# Patient Record
Sex: FEMALE | Race: WHITE | Employment: FULL TIME | URBAN - METROPOLITAN AREA
[De-identification: names, ages, dates, MRNs, and addresses within clinical notes are randomized per-mention and may not be internally consistent; named-entity substitution may affect disease eponyms.]

---

## 2019-04-18 ENCOUNTER — OFFICE VISIT (OUTPATIENT)
Dept: FAMILY MEDICINE CLINIC | Facility: CLINIC | Age: 29
End: 2019-04-18
Payer: COMMERCIAL

## 2019-04-18 VITALS
DIASTOLIC BLOOD PRESSURE: 80 MMHG | OXYGEN SATURATION: 98 % | HEIGHT: 67 IN | TEMPERATURE: 98 F | SYSTOLIC BLOOD PRESSURE: 120 MMHG | HEART RATE: 80 BPM | RESPIRATION RATE: 18 BRPM | WEIGHT: 235 LBS | BODY MASS INDEX: 36.88 KG/M2

## 2019-04-18 DIAGNOSIS — H65.02 NON-RECURRENT ACUTE SEROUS OTITIS MEDIA OF LEFT EAR: ICD-10-CM

## 2019-04-18 DIAGNOSIS — J03.90 EXUDATIVE TONSILLITIS: ICD-10-CM

## 2019-04-18 DIAGNOSIS — R50.9 FEVER, UNSPECIFIED FEVER CAUSE: Primary | ICD-10-CM

## 2019-04-18 PROCEDURE — 99202 OFFICE O/P NEW SF 15 MIN: CPT | Performed by: PHYSICIAN ASSISTANT

## 2019-04-18 PROCEDURE — 87880 STREP A ASSAY W/OPTIC: CPT | Performed by: PHYSICIAN ASSISTANT

## 2019-04-18 PROCEDURE — 87502 INFLUENZA DNA AMP PROBE: CPT | Performed by: PHYSICIAN ASSISTANT

## 2019-04-18 RX ORDER — AMOXICILLIN 875 MG/1
875 TABLET, COATED ORAL 2 TIMES DAILY
Qty: 20 TABLET | Refills: 0 | Status: SHIPPED | OUTPATIENT
Start: 2019-04-18 | End: 2019-04-28

## 2019-04-18 NOTE — PROGRESS NOTES
CHIEF COMPLAINT:   Patient presents with:  Cough: fever x 4 days       HPI:   Braxton Perales is a 34year old female who presents for uri symptoms for  4 days.   Patient reports temps up to 101.3, body aches the first 2days- now resolved, nasal congestion, WNL.      NOSE: Nostrils patent, + minimal nasal discharge, nasal mucosa inflamed   THROAT: oral mucosa pink, moist. Posterior pharynx  erythematous and injected. + exudates to left. Tonsils 2+/4.   No uvular deviation, drooling, muffled voice, hot potato reduce your discomfort while giving your throat a chance to heal.  Moisten and soothe your throat  Tips include the following:  · Try a sip of water first thing after waking up.   · Keep your throat moist by drinking 6 or more glasses of clear liquids every and swollen glands in the neck or jaw   Date Last Reviewed: 8/1/2016  © 9936-6313 [employer]. [address] [facility] [place], [facility] [place]. All rights reserved. This information is not intended as a substitute for professional medical care.  Alw

## 2019-04-22 ENCOUNTER — HOSPITAL ENCOUNTER (OUTPATIENT)
Age: 29
Discharge: HOME | End: 2019-04-22
Attending: FAMILY MEDICINE
Payer: COMMERCIAL

## 2019-04-22 VITALS
SYSTOLIC BLOOD PRESSURE: 117 MMHG | HEART RATE: 78 BPM | WEIGHT: 230 LBS | OXYGEN SATURATION: 98 % | TEMPERATURE: 98 F | HEIGHT: 67 IN | BODY MASS INDEX: 36.1 KG/M2 | DIASTOLIC BLOOD PRESSURE: 85 MMHG | RESPIRATION RATE: 16 BRPM

## 2019-04-22 DIAGNOSIS — J33.9 NASAL POLYP: ICD-10-CM

## 2019-04-22 DIAGNOSIS — R09.81 NASAL CONGESTION: ICD-10-CM

## 2019-04-22 DIAGNOSIS — R04.0 EPISTAXIS: Primary | ICD-10-CM

## 2019-04-22 PROCEDURE — 99203 OFFICE O/P NEW LOW 30 MIN: CPT

## 2019-04-22 PROCEDURE — 99204 OFFICE O/P NEW MOD 45 MIN: CPT

## 2019-04-22 RX ORDER — METHYLPREDNISOLONE 4 MG/1
TABLET ORAL
Qty: 21 TABLET | Refills: 0 | Status: SHIPPED | OUTPATIENT
Start: 2019-04-22 | End: 2019-07-02

## 2019-04-22 NOTE — ED INITIAL ASSESSMENT (HPI)
Frequent nose bleeds from the right side x few days. States this am after a coughing fit took 40minutes to stop the bleeding. Finally blew out a clot .  Using a saline nose spray with gel

## 2019-04-22 NOTE — ED PROVIDER NOTES
Patient Seen in: [address] [facility]    History   Patient presents with:  Nose Bleed (nasopharyngeal)    Stated Complaint: frequent nosebleeds/coughing    HPI    **46-year-old female presents to the immediate care today with chief complaints There are no obvious bleeders noted in the nasal mucosa. There is a small ulcer noted on the right nasal septum. The left nasal cavity has a very large nasal polyp. No active bleeding noted from it. No acute epistaxis noted.   Throat: lips, mucosa, and

## 2019-07-02 ENCOUNTER — APPOINTMENT (OUTPATIENT)
Dept: GENERAL RADIOLOGY | Age: 29
End: 2019-07-02
Attending: FAMILY MEDICINE
Payer: COMMERCIAL

## 2019-07-02 ENCOUNTER — HOSPITAL ENCOUNTER (OUTPATIENT)
Age: 29
Discharge: HOME | End: 2019-07-02
Attending: FAMILY MEDICINE
Payer: COMMERCIAL

## 2019-07-02 VITALS
TEMPERATURE: 98 F | SYSTOLIC BLOOD PRESSURE: 110 MMHG | WEIGHT: 225 LBS | OXYGEN SATURATION: 100 % | BODY MASS INDEX: 35 KG/M2 | HEART RATE: 76 BPM | DIASTOLIC BLOOD PRESSURE: 62 MMHG | RESPIRATION RATE: 16 BRPM

## 2019-07-02 DIAGNOSIS — M77.50 FOOT TENDINITIS: Primary | ICD-10-CM

## 2019-07-02 PROCEDURE — 99213 OFFICE O/P EST LOW 20 MIN: CPT

## 2019-07-02 PROCEDURE — 73630 X-RAY EXAM OF FOOT: CPT | Performed by: FAMILY MEDICINE

## 2019-07-02 RX ORDER — NAPROXEN SODIUM 220 MG
1-2 TABLET ORAL
COMMUNITY

## 2019-07-02 RX ORDER — NAPROXEN 500 MG/1
500 TABLET ORAL 2 TIMES DAILY PRN
Qty: 20 TABLET | Refills: 0 | Status: SHIPPED | OUTPATIENT
Start: 2019-07-02 | End: 2019-07-09

## 2019-07-02 NOTE — ED PROVIDER NOTES
Patient Seen in: [address] [facility]    History   Patient presents with: Foot Pain    Stated Complaint: left foot pain    HPI    53-year-old female presents with complaints of left foot pain for the past 1 week.   She recently started running swelling.     ED Course   Labs Reviewed - No data to display       Xr Foot, Complete (min 3 Views), Left (cpt=73630)    Result Date: 7/2/2019  PROCEDURE:  XR FOOT, COMPLETE (MIN 3 VIEWS), LEFT (CPT=73630)  TECHNIQUE:  AP, oblique, and lateral views were obt

## 2019-07-02 NOTE — ED INITIAL ASSESSMENT (HPI)
Wed sathish Pt states she went jogging, Pt woke with left lateral foot pain.  +constant ache with intermittent sharpness when pressure applied/ambulation. Denies previous injury.

## 2019-07-23 LAB
CONTROL LINE PRESENT WITH A CLEAR BACKGROUND (YES/NO): YES YES/NO
POCT INFLUENZA A: NEGATIVE
POCT INFLUENZA B: NEGATIVE

## 2020-02-03 ENCOUNTER — OFFICE VISIT (OUTPATIENT)
Dept: FAMILY MEDICINE CLINIC | Facility: CLINIC | Age: 30
End: 2020-02-03
Payer: COMMERCIAL

## 2020-02-03 VITALS
OXYGEN SATURATION: 98 % | HEART RATE: 77 BPM | SYSTOLIC BLOOD PRESSURE: 122 MMHG | BODY MASS INDEX: 34.53 KG/M2 | TEMPERATURE: 99 F | DIASTOLIC BLOOD PRESSURE: 66 MMHG | HEIGHT: 67 IN | WEIGHT: 220 LBS

## 2020-02-03 DIAGNOSIS — R68.89 FLU-LIKE SYMPTOMS: Primary | ICD-10-CM

## 2020-02-03 DIAGNOSIS — H66.002 NON-RECURRENT ACUTE SUPPURATIVE OTITIS MEDIA OF LEFT EAR WITHOUT SPONTANEOUS RUPTURE OF TYMPANIC MEMBRANE: ICD-10-CM

## 2020-02-03 LAB
OPERATOR ID: NORMAL
POCT INFLUENZA A: NEGATIVE
POCT INFLUENZA B: NEGATIVE

## 2020-02-03 PROCEDURE — 87502 INFLUENZA DNA AMP PROBE: CPT | Performed by: PHYSICIAN ASSISTANT

## 2020-02-03 PROCEDURE — 99213 OFFICE O/P EST LOW 20 MIN: CPT | Performed by: PHYSICIAN ASSISTANT

## 2020-02-03 RX ORDER — AMOXICILLIN 875 MG/1
875 TABLET, COATED ORAL 2 TIMES DAILY
Qty: 20 TABLET | Refills: 0 | Status: SHIPPED | OUTPATIENT
Start: 2020-02-03 | End: 2020-02-13

## 2020-02-03 NOTE — PATIENT INSTRUCTIONS
-Sudafed and flonase  -Push fluids  -Go to the ER with any worsening symptoms. Middle Ear Infection (Adult)  You have an infection of the middle ear, the space behind the eardrum. This is also called acute otitis media (AOM).  Sometimes it is caused by professional's instructions.

## 2020-02-03 NOTE — PROGRESS NOTES
CHIEF COMPLAINT:   Patient presents with:  URI: cough, congestion, flem, left ear pain x 2 days. HPI:   Caroline Davies is a 34year old female who presents for URI symtoms since yesterday.   Patient reports fevers up to 100, body aches, nasal congest retraction, + fluid, bony landmarks obscured.  Left EAC WNL.  Right TM not erythematous, no bulging, no retraction, + fluid, bony landmarks intact.  Right EAC WNL.       NOSE: Nostrils patent, + nasal discharge, nasal mucosa inflamed   THROAT: oral mucosa p Ear Infection (Adult)  You have an infection of the middle ear, the space behind the eardrum. This is also called acute otitis media (AOM). Sometimes it is caused by the common cold.  This is because congestion can block the internal passage (eustachian tub issues and agrees to the plan. The patient is asked to return if sx's persist or worsen.

## 2021-01-07 ENCOUNTER — APPOINTMENT (OUTPATIENT)
Dept: GENERAL RADIOLOGY | Age: 31
End: 2021-01-07
Attending: NURSE PRACTITIONER
Payer: COMMERCIAL

## 2021-01-07 ENCOUNTER — HOSPITAL ENCOUNTER (OUTPATIENT)
Age: 31
Discharge: HOME | End: 2021-01-07
Payer: COMMERCIAL

## 2021-01-07 VITALS
DIASTOLIC BLOOD PRESSURE: 70 MMHG | HEART RATE: 79 BPM | RESPIRATION RATE: 16 BRPM | TEMPERATURE: 98 F | SYSTOLIC BLOOD PRESSURE: 126 MMHG

## 2021-01-07 DIAGNOSIS — S69.92XA INJURY OF LEFT HAND, INITIAL ENCOUNTER: Primary | ICD-10-CM

## 2021-01-07 DIAGNOSIS — S60.222A CONTUSION OF LEFT HAND, INITIAL ENCOUNTER: ICD-10-CM

## 2021-01-07 PROCEDURE — L3809 WHFO W/O JOINTS PRE OTS: HCPCS | Performed by: NURSE PRACTITIONER

## 2021-01-07 PROCEDURE — 73130 X-RAY EXAM OF HAND: CPT | Performed by: NURSE PRACTITIONER

## 2021-01-07 PROCEDURE — 99203 OFFICE O/P NEW LOW 30 MIN: CPT | Performed by: NURSE PRACTITIONER

## 2021-01-07 NOTE — ED INITIAL ASSESSMENT (HPI)
Pt sts hit a punching bag very hard with left hand during a work out on Tuesday. Pain/popping to 5th MP joint. Pt is right handed.

## 2021-01-07 NOTE — ED PROVIDER NOTES
[facility]      History   Patient presents with:  Arm or Hand Injury    Stated Complaint: hand pain/injury    HPI/Subjective:   21-year-old female presents to the IC with complaints of left hand pain over the fifth knuckle reg developed, well nourished,in no apparent distress  SKIN: no rashes,no suspicious lesions  NECK: supple,no adenopathy,no bruits  LUNGS: clear to auscultation  CARDIO: RRR     EXTREMITIES: no cyanosis, clubbing or edema  Exam of L wrist -->   - swelling: yes return and appropriate follow-up was given in writing. Patient and/or family agrees to return for any concerns, problems or complications as discussed and voiced understanding and all questions were answered at this time.                          Dispositi

## 2021-01-15 ENCOUNTER — HOSPITAL ENCOUNTER (OUTPATIENT)
Age: 31
Discharge: HOME | End: 2021-01-15
Payer: COMMERCIAL

## 2021-01-15 VITALS
SYSTOLIC BLOOD PRESSURE: 125 MMHG | OXYGEN SATURATION: 100 % | TEMPERATURE: 98 F | DIASTOLIC BLOOD PRESSURE: 70 MMHG | RESPIRATION RATE: 18 BRPM | HEART RATE: 71 BPM

## 2021-01-15 DIAGNOSIS — M54.50 LUMBAR PAIN: Primary | ICD-10-CM

## 2021-01-15 PROCEDURE — 99213 OFFICE O/P EST LOW 20 MIN: CPT | Performed by: PHYSICIAN ASSISTANT

## 2021-01-15 RX ORDER — TRAMADOL HYDROCHLORIDE 50 MG/1
TABLET ORAL EVERY 6 HOURS PRN
Qty: 10 TABLET | Refills: 0 | Status: SHIPPED | OUTPATIENT
Start: 2021-01-15 | End: 2021-01-22

## 2021-01-15 RX ORDER — CYCLOBENZAPRINE HCL 10 MG
10 TABLET ORAL 3 TIMES DAILY PRN
Qty: 20 TABLET | Refills: 0 | Status: SHIPPED | OUTPATIENT
Start: 2021-01-15 | End: 2021-01-22

## 2021-01-15 NOTE — ED INITIAL ASSESSMENT (HPI)
Patient c/o right sided low back pain since Wed night. States it began hurting after doing jumping jacks and squats at the gym. Pain increases with movement.

## 2021-09-03 ENCOUNTER — ORDER TRANSCRIPTION (OUTPATIENT)
Dept: ADMINISTRATIVE | Facility: HOSPITAL | Age: 31
End: 2021-09-03

## 2021-09-03 ENCOUNTER — HOSPITAL ENCOUNTER (OUTPATIENT)
Age: 31
Discharge: HOME | End: 2021-09-03
Payer: COMMERCIAL

## 2021-09-03 VITALS
RESPIRATION RATE: 16 BRPM | SYSTOLIC BLOOD PRESSURE: 149 MMHG | DIASTOLIC BLOOD PRESSURE: 91 MMHG | HEART RATE: 80 BPM | OXYGEN SATURATION: 99 % | TEMPERATURE: 99 F

## 2021-09-03 DIAGNOSIS — R09.81 NASAL CONGESTION: Primary | ICD-10-CM

## 2021-09-03 DIAGNOSIS — Z01.812 PRE-PROCEDURE LAB EXAM: Primary | ICD-10-CM

## 2021-09-03 PROCEDURE — 99213 OFFICE O/P EST LOW 20 MIN: CPT | Performed by: NURSE PRACTITIONER

## 2021-09-03 NOTE — ED INITIAL ASSESSMENT (HPI)
Patient exposed to covid last Thursday. Patient started feeling fatigue, cough, chills, aches, and congestion since yesterday.

## 2021-09-03 NOTE — ED PROVIDER NOTES
Patient Seen in: [facility]      History   Patient presents with:  Nasal Congestion  Fatigue  Cough/URI    Stated Complaint: headache coughing/exposed to CV 19    HPI/Subjective:   28-year-old female who presents to the IC with complaints of c Atraumatic. Extraocular motions are intact  NECK: Supple. No meningitic signs are noted. CHEST/LUNGS: Clear to auscultation. There is no respiratory distress noted. HEART/CARDIOVASCULAR: Regular. There is no tachycardia. SKIN: There is no rash.   Hardeep Juarez

## 2021-09-04 LAB — SARS-COV-2 RNA RESP QL NAA+PROBE: NOT DETECTED

## 2022-05-22 ENCOUNTER — APPOINTMENT (OUTPATIENT)
Dept: CT IMAGING | Age: 32
End: 2022-05-22
Attending: EMERGENCY MEDICINE

## 2022-05-22 ENCOUNTER — HOSPITAL ENCOUNTER (EMERGENCY)
Age: 32
Discharge: HOME | End: 2022-05-22
Attending: EMERGENCY MEDICINE

## 2022-05-22 ENCOUNTER — OFFICE VISIT (OUTPATIENT)
Dept: FAMILY MEDICINE CLINIC | Facility: CLINIC | Age: 32
End: 2022-05-22
Payer: COMMERCIAL

## 2022-05-22 DIAGNOSIS — Z02.9 ENCOUNTER FOR ADMINISTRATIVE EXAMINATIONS: Primary | ICD-10-CM

## 2022-05-22 NOTE — PROGRESS NOTES
Patient here in parking lot of walk in clinic for evaluation of dizziness, hives, shortness of breath and vomiting since last night. Denies cough/congestion.  answering for patient. Advised due to limitations in walk in clinic and current wait time patient would need to be evaluated at higher level of care. Directed to emergency room in [place].  is amenable to driving patient there. No charge for visit as patient sent to higher level of care.

## 2022-05-27 NOTE — TELEPHONE ENCOUNTER
Patient is requesting a note to return to work on Tuesday, 05/31/2022. 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 Pt would like a call once letter is ready and she would like to pick it up either today or tomorrow if possible.

## 2024-03-06 NOTE — H&P
Clinic Note [facility]     Assessment/Plan:  33 year old female    Left wrist dorsal carpal ganglion cyst versus extensor retinacular cyst-at this point and recommend either conservative management or surgical invention.  We discussed pros and cons and risks and benefits as well as recovery time for surgery as well is expected outcomes and she elected to proceed.  She is very to have this removed.  All of her questions were answered and she is agreement the plan.    No diagnosis found.     Follow Up: With Dr. Salmon on video visit    Diagnostic Studies:  X-rays are negative for acute fractures    Physical Exam:    Ht 5' 7\" (1.702 m)   Wt 220 lb (99.8 kg)   BMI 34.46 kg/m²     Constitutional: NAD. AOx3. Well-developed and Well-nourished.   Psychiatric: Normal mood/ affect/ behavior. Judgment and thought content normal.     Left upper Extremity:   Inspection: Skin Intact. No skin lesions. No gross deformity.  1 to 2 cm dorsal carpal ganglion cyst over the SL interval.  It does move with extension flexion of the fingers   Palpation: Tender palpation over the dorsal carpal ganglion cyst   Motion: Elbow: normal bilateral symmetric ext/flex  Wrist: normal bilateral symmetric ext/flex/sup/pro  Finger: full composite fist       CC: Cyst (LT WRIST; ONSET: 1 YEAR AGO; HAS POPPED IT IN THE PAST )        HPI: This 33 year old female presents with complaints of a mass to her left wrist.  She states she noticed it a year ago and would pop it on her own and it would go away for a while.  She has tried to pop it but it would not go down at this point.  She notices more pain with certain activities.  She rates it moderate.  She states the pain will wake her up at night depending on how she sleeps.  She has tried bracing and anti-inflammatories with minimal relief.    Occupation: [employer]    No past medical history on file.  Past Surgical History:   Procedure Laterality Date    EXCIS LUMBAR DISK,ONE LEVEL       Current Outpatient  Medications   Medication Sig Dispense Refill    Naproxen Sodium 220 MG Oral Tab Take 1-2 tablets (220-440 mg total) by mouth.      diphenhydrAMINE 25mg Take 1 tablet by mouth.      famotidine 20 MG Oral Tab Take 1 tablet (20 mg total) by mouth 2 (two) times daily. 30 tablet 0    hydrOXYzine 10 MG Oral Tab Take 1 tablet (10 mg total) by mouth 3 (three) times daily as needed for Itching. 30 tablet 0     Allergies   Allergen Reactions    Lactase-Rennet DIARRHEA and NAUSEA AND VOMITING    Ibuprofen OTHER (SEE COMMENTS)     Nose bleeds     Gluten Meal NAUSEA ONLY     No family history on file.  Social History     Occupational History    Not on file   Tobacco Use    Smoking status: Never    Smokeless tobacco: Never   Substance and Sexual Activity    Alcohol use: Not on file    Drug use: Not on file    Sexual activity: Not on file        Review of Systems (negative unless bolded):  General: fevers, chills, fatigue  CV:  chest pain, palpitations, leg swelling  Msk: bodyaches, neck pain, neck stiffness  Skin: rashes, open wounds, nonhealing ulcers  Hem: bleeds easily, bruise easily, immunocompromised  Neuro: dizziness, light headedness, headaches  Psych: anxious, depressed, anger issues      Maribel Nunez PA-C  Hand, Wrist, & Elbow Surgery  Physician Assistant to Dr. Chau Salmon  [facility] [place] [facility]  [address] [place] [address], [address], [email or web address]  [email or web address]  t: [phone]  f: [phone]

## 2024-03-13 NOTE — PROGRESS NOTES
Clinic Note     Assessment/Plan:  33 year old female    Left wrist dorsal carpal ganglion cyst -patient has failed nonsurgical management with received with surgical excision.  Risk associated surgery, expected outcomes, time recovery and the possible need for therapy was reviewed with the patient prior to consent.  Patient consented to excision of left ganglion cyst.  Surgical date 5/20/2024    Follow Up: 5/20/2024    Diagnostic Studies:  X-rays are negative for acute fractures    Physical Exam:    There were no vitals taken for this visit.    Constitutional: NAD. AOx3. Well-developed and Well-nourished.   Psychiatric: Normal mood/ affect/ behavior. Judgment and thought content normal.     Left upper Extremity:   Inspection: Skin Intact. No skin lesions. 1 to 2 cm dorsal carpal ganglion cyst over the SL interval.     Palpation: Tender palpation over the dorsal carpal ganglion cyst   Motion: Elbow: normal bilateral symmetric ext/flex  Wrist: normal bilateral symmetric ext/flex/sup/pro  Finger: full composite fist       CC: Left wrist cyst    HPI: This 33 year old female presents with complaints of a mass to her left wrist.  She states she noticed it a year ago and would pop it on her own and it would go away for a while.  She has tried to pop it but it would not go down at this point.  She notices more pain with certain activities.  She rates it moderate.  She states the pain will wake her up at night depending on how she sleeps.  She has tried bracing and anti-inflammatories with minimal relief.    Occupation: [employer]    Interval Hx (3/13/2024): No interval changes in her symptoms      No past medical history on file.  Past Surgical History:   Procedure Laterality Date    EXCIS LUMBAR DISK,ONE LEVEL       Current Outpatient Medications   Medication Sig Dispense Refill    diphenhydrAMINE 25mg Take 1 tablet by mouth.      famotidine 20 MG Oral Tab Take 1 tablet (20 mg total) by mouth 2 (two) times daily. 30 tablet 0     hydrOXYzine 10 MG Oral Tab Take 1 tablet (10 mg total) by mouth 3 (three) times daily as needed for Itching. 30 tablet 0    Naproxen Sodium 220 MG Oral Tab Take 1-2 tablets (220-440 mg total) by mouth.       Allergies   Allergen Reactions    Lactase-Rennet DIARRHEA and NAUSEA AND VOMITING    Ibuprofen OTHER (SEE COMMENTS)     Nose bleeds     Gluten Meal NAUSEA ONLY     No family history on file.  Social History     Occupational History    Not on file   Tobacco Use    Smoking status: Never    Smokeless tobacco: Never   Substance and Sexual Activity    Alcohol use: Not on file    Drug use: Not on file    Sexual activity: Not on file        Review of Systems (negative unless bolded):  General: fevers, chills, fatigue  CV:  chest pain, palpitations, leg swelling  Msk: bodyaches, neck pain, neck stiffness  Skin: rashes, open wounds, nonhealing ulcers  Hem: bleeds easily, bruise easily, immunocompromised  Neuro: dizziness, light headedness, headaches  Psych: anxious, depressed, anger issues    Chau Salmon MD   Hand, Wrist, & Elbow Surgery  [email or web address]  t: [phone]  f: [phone]    Video Telehealth Visit    This clinician is part of the telehealth program and is conducting this visit in a currently approved location. For this visit the clinician and patient were present via interactive audio & video telecommunications system that permits two-way, real-time/synchronous communications. There are limitations of this visit as no hands-on physical examination could be performed. Amy MARSHALL [place] verbally consents to a Telemedicine Visit on 03/24/24. Patient and clinician are currently located in the [facility].    Patient understands and accepts financial responsibility for any deductible, co-insurance and/or co-pays associated with this service.    This billing was spent on history taking; observational physical exam; review of diagnostic testing, medications, and decision making.

## 2024-03-13 NOTE — PROGRESS NOTES
SURGICAL BOOKING SHEET   Name: Amy Rosario  MRN: [id number]   : 1990    Surgical Date:   24   Surgical Consent:   Excision of left dorsal ganglion cyst   Diagnosis:      ICD-10-CM   1. Ganglion of left wrist  M67.432       Procedure Codes:   Excision of ganglion cyst (63176)   Disposition:   Outpatient   Operative Time:   15 mins   Antibiotics:   Ancef 2g   Anesthesia Type:    [place] Block   Clearance:    NONE   Equipment:   GC Excision: [place] Blade, 4-0 Monocryl, 0.5% Marcaine (on field), Exofin, Telfa+Tegaderm    Assistant:   Luis Antonio Assistant: Yes, Maribel Nunez PA-C   Follow Up:   7-14 days post op with Maribel   Pain Medication:   Norco 325-5mg   Therapy:   None

## 2024-03-20 NOTE — TELEPHONE ENCOUNTER
Date of Surgery: 24       Post Op Appt:  24 @ 0820    Case ID: [id number]    Notes:       SURGICAL BOOKING SHEET   Name: Amy Rosario  MRN: [id number]   : 1990     Surgical Date:    24   Surgical Consent:    Excision of left dorsal ganglion cyst   Diagnosis:         ICD-10-CM   1. Ganglion of left wrist  M67.432       Procedure Codes:    Excision of ganglion cyst (17775)   Disposition:    Outpatient   Operative Time:    15 mins   Antibiotics:    Ancef 2g   Anesthesia Type:     [place] Block   Clearance:     NONE   Equipment:    GC Excision: [place] Blade, 4-0 Monocryl, 0.5% Marcaine (on field), Exofin, Telfa+Tegaderm    Assistant:    Luis Antonio Assistant: Yes, Maribel Nunez PA-C   Follow Up:    7-14 days post op with Maribel   Pain Medication:    Norco 325-5mg   Therapy:    None

## 2024-03-21 NOTE — TELEPHONE ENCOUNTER
Called and spoke with Amy in regards to her upcoming surgery. I did confirm that her surgery will take place at [facility]. I also went over the surgical protocol and scheduled her for her post op appt. She states understanding with no concerns or questions.

## 2024-04-20 NOTE — ED INITIAL ASSESSMENT (HPI)
Pt began with rt sided achilles pain while doing squats at the gym.  She was working through it and heard a 'Pop\"  she now has pain and swelling to the rt ankle

## 2024-04-20 NOTE — ED PROVIDER NOTES
Patient Seen in: [facility]      History     Chief Complaint   Patient presents with    Leg or Foot Injury     Pain and swelling in right ankle. - Entered by patient     Stated Complaint: Leg or Foot Injury - Pain and swelling in right ankle.    Subjective:   33-year-old female accompanied by her spouse.  Patient complain of pain to the entire right ankle, as well as by the right Achilles tendon.  States yesterday she was working out.  First did squats.  Then she went on to do calf raises.  While she was doing calf raises, she felt a pop behind her ankle.  Has been barely able to put any weight on the right ankle.  Has not taken anything for pain, has been using ice.  She cannot take ibuprofen as she tells me that it causes her nosebleeds.  States she can take naproxen as this does not cause nosebleeding.  However, she is due to have left wrist surgery later this week, and was told only to take Tylenol for pain.  States she will buy some over-the-counter Tylenol later.            Objective:   Past Medical History:    PCOS (polycystic ovarian syndrome)              Past Surgical History:   Procedure Laterality Date    Excis lumbar disk,one level                  Social History     Socioeconomic History    Marital status:    Tobacco Use    Smoking status: Never     Passive exposure: Never    Smokeless tobacco: Never   Vaping Use    Vaping status: Never Used   Substance and Sexual Activity    Alcohol use: Yes     Comment: RARELY    Drug use: Never              Review of Systems   Musculoskeletal:         Right ankle pain   All other systems reviewed and are negative.      Positive for stated complaint: Leg or Foot Injury - Pain and swelling in right ankle.  Other systems are as noted in HPI.  Constitutional and vital signs reviewed.      All other systems reviewed and negative except as noted above.    Physical Exam     ED Triage Vitals [04/20/24 1027]   /89   Pulse 94   Resp 18   Temp 98.3 °F  (36.8 °C)   Temp src Temporal   SpO2 100 %   O2 Device None (Room air)       Current:/89   Pulse 94   Temp 98.3 °F (36.8 °C) (Temporal)   Resp 18   LMP 03/26/2024   SpO2 100%         Physical Exam  Vitals and nursing note reviewed.   Constitutional:       General: She is in acute distress.      Appearance: Normal appearance. She is not ill-appearing, toxic-appearing or diaphoretic.   Musculoskeletal:      Right lower leg: Normal. No swelling, deformity or bony tenderness. No edema.      Right ankle: Swelling present. No deformity or ecchymosis. Tenderness present. No lateral malleolus, medial malleolus or proximal fibula tenderness. Decreased range of motion. Normal pulse.      Right Achilles Tendon: Tenderness present.      Right foot: Normal.      Comments: Tenderness to the Achilles region with the squeeze test.  There is some plantarflexion with the De Paz squeeze test.   Skin:     Capillary Refill: Capillary refill takes less than 2 seconds.      Findings: No bruising.   Neurological:      General: No focal deficit present.      Mental Status: She is alert.   Psychiatric:         Mood and Affect: Mood normal.               ED Course   Labs Reviewed - No data to display  XR ANKLE (MIN 3 VIEWS), RIGHT (CPT=73610)    Result Date: 4/20/2024  PROCEDURE:  XR ANKLE (MIN 3 VIEWS), RIGHT (CPT=73610)  TECHNIQUE:  Three views were obtained.  COMPARISON:  None.  INDICATIONS:  Leg or Foot Injury - Pain and swelling in right ankle.  PATIENT STATED HISTORY: (As transcribed by Technologist)  Patient states she felt a pop to right ankle yesterday while doing calf exercises. Patient has pain to posteruir and anteruir right ankle.               CONCLUSION:   Negative for fracture or malalignment.  The ankle mortise and joint spaces of the hindfoot are preserved.  Elongated posterior talar process noted.  Moderate soft tissue swelling diffusely about the ankle with suspected tibiotalar joint effusion.  Mild Achilles  heel spur.  No soft tissue swelling in the expected location of the Achilles tendon.   LOCATION:  [facility]   Dictated by (CST): Adonis Peña MD on 4/20/2024 at 11:22 AM     Finalized by (CST): Adonis Peña MD on 4/20/2024 at 11:23 AM                       [facility]                                         Medical Decision Making  Differential diagnosis initially included but was not limited to: Ankle fracture, ankle sprain, Achilles injury    Nontoxic 33-year-old female, with right ankle pain.  Felt a pop behind the right ankle yesterday during a calf raise.  Concerning for an Achilles injury.  Will do x-ray.  Given a dose of Norco.  Will prescribe Norco as well.  Will apply short leg postmold.  She declined crutches.  Recommend a knee scooter.  Also recommend getting a knee scooter, as she also has left wrist issues.  She is scheduled to have a cyst in the wrist removed this upcoming week.  Follow-up with Ortho.  She has an Ortho appointment with her own Ortho for this upcoming Tuesday.  I gave her a note for no work until cleared by Ortho, as I will be putting a short leg postmold to her right leg.  I personally viewed, independently interpreted and radiology report was reviewed.  My personal interpretation is no ankle fracture.  Supportive/home management of diagnosis/illness/injury discussed. Red flag symptoms discussed.  Signs and symptoms/criteria that would necessitate reevaluation, including ER evaluation discussed.  Patient and/or responsible adult verbalize and agree with management and plan of care.    Speech recognition software was used during this dictation.  There may be minor errors in transcription.              Amount and/or Complexity of Data Reviewed  Radiology: ordered and independent interpretation performed. Decision-making details documented in ED Course.  ECG/medicine tests: ordered. Decision-making details documented in ED Course.  Discussion of management or test interpretation with external  provider(s): Norco    Risk  OTC drugs.  Prescription drug management.        Disposition and Plan     Clinical Impression:  1. Acute right ankle pain    2. Injury of right Achilles tendon, initial encounter         Disposition:  Discharge  4/20/2024 11:31 am    Follow-up:  Adeola Quintero, DPM  [address] [place] [id number]  [phone]    Call in 2 days  Ortho recommendation          Medications Prescribed:  Discharge Medication List as of 4/20/2024 11:33 AM        START taking these medications    Details   HYDROcodone-acetaminophen 5-325 MG Oral Tab Take 1 tablet by mouth every 6 (six) hours as needed for Pain., Normal, Disp-15 tablet, R-0

## 2024-04-20 NOTE — DISCHARGE INSTRUCTIONS
Over-the-counter Tylenol as needed for pain.  Take the Norco for pain not relieved by Tylenol.  Follow-up with the orthopedist.  Minimal to no weightbearing to the affected leg.  Go to the ER for new or worsening symptoms.

## 2024-04-24 NOTE — PROGRESS NOTES
[facility] New Patient Note    CC:   Chief Complaint   Patient presents with    Leg or Foot Injury     Rt achilles inj, doi: 04.19.24 while working out  Pain: 8/10  Sz: 9.5W        HPI: The patient is a 33 year old female who presents today with complaints of popping sensation that it has been recurrent for the last 5 days.  She was lifting weights and doing lower extremity work 5 days ago and experienced pain swelling and popping sensation.  She had been doing squats and calf raising    Hx of previous ankle injuries and sprains  She does not relate any calf pain or tightness recently.  She does have a history of Achilles tendinitis of that ankle though.    She was seen at urgent care and an Achilles tendon injury suspected.  She is here in a [place] splint and has been nonweightbearing.  Past Medical History:    PCOS (polycystic ovarian syndrome)     Past Surgical History:   Procedure Laterality Date    Excis lumbar disk,one level       Current Outpatient Medications   Medication Sig Dispense Refill    acetaminophen 325 MG Oral Tab Take 325 mg by mouth every 6 (six) hours as needed for Pain.      HYDROcodone-acetaminophen 5-325 MG Oral Tab Take 1 tablet by mouth every 6 (six) hours as needed for Pain. 15 tablet 0    Multiple Vitamins-Minerals (BIOTIN PLUS/CALCIUM/VIT D3) Oral Tab Take by mouth.      cyanocobalamin 1000 MCG Oral Tab Take 1,000 mcg by mouth daily.      diphenhydrAMINE 25mg Take 1 tablet by mouth.      famotidine 20 MG Oral Tab Take 1 tablet (20 mg total) by mouth 2 (two) times daily. 30 tablet 0    hydrOXYzine 10 MG Oral Tab Take 1 tablet (10 mg total) by mouth 3 (three) times daily as needed for Itching. 30 tablet 0    Naproxen Sodium 220 MG Oral Tab Take 1-2 tablets (220-440 mg total) by mouth.       Allergies   Allergen Reactions    Lactase-Rennet DIARRHEA and NAUSEA AND VOMITING    Ibuprofen OTHER (SEE COMMENTS)     Nose bleeds     Gluten Meal NAUSEA ONLY     History reviewed. No  pertinent family history.  Social History     Occupational History    Not on file   Tobacco Use    Smoking status: Never     Passive exposure: Never    Smokeless tobacco: Never   Vaping Use    Vaping status: Never Used   Substance and Sexual Activity    Alcohol use: Yes     Comment: RARELY    Drug use: Never    Sexual activity: Not on file        ROS:  Complete ROS reviewed by me and non-contributory to the chief complaint except as mentioned above.    Physical Exam:    [facility] 03/14/2024       Exam right ankle she has a lot of tenderness posterior aspect but the Achilles tendon palpation appears intact.  Very tender about the myotendinous junction and along the lateral and medial aspect of the Achilles tendon distally.  Negative De Paz's test  Moderate swelling and ecchymosis.  She has no foot pain  Sensation is intact sharp versus dull.  She can feel light touch to the tips of the toes  Palpable pedal pulses.  Hair growth is present.  Skin is supple and feet are well perfused and warm.    Strength testing deferred today due to pain  ROM testing limited due to pain as well        Imaging: Old ossific densities off the medial and lateral malleolus suggestive of previous injuries.  Increased soft tissue density about the Achilles tendon region.  Enlarged posterior process of the talus.  Personally viewed, independently interpreted and radiology report read.      Assessment/Diagnoses:  Diagnoses and all orders for this visit:    Partial tear of right Achilles tendon, initial encounter  -     DME - EXTERNAL   -     [facility] MRI FOOT, RIGHT (CPT=73718); Future        Plan:  I reviewed imaging and exam findings with the patient and her  with her today.  Suspicion for myotendinous junction tear, likely partial tear of the Achilles tendon.  Also irritation of existing ankle injuries in the past and avulsion fragments about the medial and lateral malleolar line from previous injuries.    We looked at the x-rays  together.  I discussed the Achilles tendon injury and the history of some tendinitis.  Also the mechanism of injury and the squats and calf raises.    Recommending an MRI to check for a tear and to guide in treatment.  I do not think this is a surgical problem.    We fit her for a high-profile boot with heel wedge and continue nonweightbearing.  She recently had surgery with Dr. Salmon so the knee scooter is going to be much more useful for her than the crutches.        She is alternating Tylenol and Norco.  Discussed that there is Tylenol in the Norco so we will be cautious about that.  She cannot take ibuprofen or similar. RICE    I will let her know results of [facility]    Adeola Quintero, [facility]  [facility]  [address] [place] [address], [address] [place] [address]   [address] [place] [address]   [address] [place], [place] [address] [place] [address].[email or web address]  t: [phone]   f: [phone]              This document was partially prepared using Dragon Medical voice recognition software.

## 2024-04-24 NOTE — TELEPHONE ENCOUNTER
Spoke with Destiney who stated she needs the MRI order to be changed from foot to ankle due to the diagnosis.

## 2024-04-24 NOTE — TELEPHONE ENCOUNTER
Destiney from [facility] called stating patients diagnosis needs to change from achilles to ankle. Please advise   [phone]

## 2024-05-01 NOTE — TELEPHONE ENCOUNTER
From: Amy Rosario  To: Adeola Quintero  Sent: 5/1/2024 1:13 PM CDT  Subject: PT & Work Release     Hello,    You mentioned you put in an order for PT for my right ankle, but I'm not sure where to go or who to contact for that. Can you direct me on who to contact to set up PT appointments?     Also, I need a letter to go back to work. Can you right me a letter releasing me to go back to work?     I know you also mentioned to do weight bearing as I'm comfortable, does the same apply for driving or do you still want me to hold off on driving until after PT?     Thank you,  Amy Rosario

## 2024-05-09 NOTE — PROGRESS NOTES
Clinic Note     Assessment/Plan:  33 year old female    Left wrist dorsal carpal ganglion cyst excision 4/23/2024-patient doing well.  She will progress to more aggressive passive range of motion.  She can progress to heavy activities as tolerated.  Met with recovery thus far.  Advised on scar massage.  She will contact me as needed.    Follow Up: As needed    Diagnostic Studies:  X-rays are negative for acute fractures    Physical Exam:    Ht 5' 7\" (1.702 m)   Wt 220 lb (99.8 kg)   LMP 03/14/2024   BMI 34.46 kg/m²     Constitutional: NAD. AOx3. Well-developed and Well-nourished.   Psychiatric: Normal mood/ affect/ behavior. Judgment and thought content normal.     Left upper Extremity:   Inspection: Incision healed well with no signs of infection   Palpation: Tender palpation over the incision   Motion: Elbow: normal bilateral symmetric ext/flex  Wrist: Extension/flexion 50/40 passively close to 60.  Finger: full composite fist       CC: Left wrist cyst    HPI: This 33 year old female presents with complaints of a mass to her left wrist.  She states she noticed it a year ago and would pop it on her own and it would go away for a while.  She has tried to pop it but it would not go down at this point.  She notices more pain with certain activities.  She rates it moderate.  She states the pain will wake her up at night depending on how she sleeps.  She has tried bracing and anti-inflammatories with minimal relief.    Occupation: [employer]    Interval Hx: Patient underwent cyst excision her postop pain is improving.      Past Medical History:    PCOS (polycystic ovarian syndrome)     Past Surgical History:   Procedure Laterality Date    Excis lumbar disk,one level       Current Outpatient Medications   Medication Sig Dispense Refill    acetaminophen 325 MG Oral Tab Take 1 tablet (325 mg total) by mouth every 6 (six) hours as needed for Pain.      Multiple Vitamins-Minerals (BIOTIN PLUS/CALCIUM/VIT D3) Oral Tab Take by  mouth.      cyanocobalamin 1000 MCG Oral Tab Take 1 tablet (1,000 mcg total) by mouth daily.      HYDROcodone-acetaminophen 5-325 MG Oral Tab Take 1 tablet by mouth every 6 (six) hours as needed for Pain. 15 tablet 0     Allergies   Allergen Reactions    Lactase-Rennet DIARRHEA and NAUSEA AND VOMITING    Ibuprofen OTHER (SEE COMMENTS)     Nose bleeds     Gluten Meal NAUSEA ONLY     History reviewed. No pertinent family history.  Social History     Occupational History    Not on file   Tobacco Use    Smoking status: Never     Passive exposure: Never    Smokeless tobacco: Never   Vaping Use    Vaping status: Never Used   Substance and Sexual Activity    Alcohol use: Yes     Comment: RARELY    Drug use: Never    Sexual activity: Not on file        Review of Systems (negative unless bolded):  General: fevers, chills, fatigue  CV:  chest pain, palpitations, leg swelling  Msk: bodyaches, neck pain, neck stiffness  Skin: rashes, open wounds, nonhealing ulcers  Hem: bleeds easily, bruise easily, immunocompromised  Neuro: dizziness, light headedness, headaches  Psych: anxious, depressed, anger issues      Maribel Barrios PA-C  Hand, Wrist, & Elbow Surgery  Physician Assistant to Dr. Chau [email or web address]  t: [phone]  f: [phone]

## 2024-05-14 NOTE — PROGRESS NOTES
LOWER EXTREMITY EVALUATION:     Diagnosis:    Partial tear of ligament of lateral aspect of ankle (S93.499A)  Tendonitis (M77.9)         Referring Provider: Leon  Date of Evaluation:    5/14/2024    Precautions:  None Next MD visit:   Date of Surgery: n/a     PATIENT SUMMARY   Amy Rosario is a 33 year old female who presents to therapy today with complaints of (R) ankle pain after gym session back around April 19th. She was doing some heel raises and felt a pop in back of her ankle as well as snapping. Not much pain until later that night where it became very swollen and painful. Hx of (R) ankle sprains and tendinitis as well but no formal therapy. MRI was taken (see chart for details) showing a tear in the ATFL however achilles not torn. She has been in a high profile boot since April 24th. Localizes pain lateral ankle as well as posteriorly. She uses crutches and also walks without them at times. Had a scooter before. Will follow up with Dr. Quintero after therapy. Works for [employer], currently mostly desk work         Pt describes pain level current 0/10, at best 1/10, at worst 8/10.   Current functional limitations include walking, driving, steps, squatting down, household activities, ADLs, work, participating in gym activities .     Amy describes prior level of function no pain or restriction. Pt goals include wanting to get back to working out, running, improve flexibility, driving  Past medical history was reviewed with Amy. Significant findings include  has a past medical history of PCOS (polycystic ovarian syndrome).      ASSESSMENT  Amy presents to physical therapy evaluation with primary c/o (R) ankle/foot pain. The results of the objective tests and measures show restricted and painful ankle ROM, decreased flexibility, impaired gait, impaired balance, swelling, decreased strength, impaired ankle mobility.  Functional deficits include but are not limited to walking, standing, running, ADLs,  household activities, squatting down, steps, work, participating in gym activities. Pt and PT discussed evaluation findings, pathology, POC and HEP.  Pt voiced understanding and performs HEP correctly without reported pain. Skilled Physical Therapy is medically necessary to address the above impairments and reach functional goals.     OBJECTIVE:   Observation: high profile boot, crutches, swelling noted (R) ankle, decreased weight bearing through (R) LE    Gait Summary: pt ambulates on level ground with crutches & high profile boot    A/PROM (Degrees):  Foot/Ankle/Knee (R) (L)   DF -5* 10   PF 25* 50   Inversion 22* 40   Eversion 5* 10   Knee Flexion     Knee Extension            Strenth/MMT (Scale 0-5/5):  Foot/Ankle/Knee/Hip (R) (L)   DF defer 5   PF defer 5   Inversion defer 5   Eversion defer 5     (B) hip strength grossly at least 4/5    Palpation:   +TTP to lateral & posterior aspect of (R) ankle    Flexibility:   Mod to max tightness (L) [id number] complex    Special tests:   Anterior Drawer (+ for pain)  De Paz's Test (-)  Inversion Stress Test (Talar Tilt) (+)    Balance: Defer    Today’s Treatment and Response:   Pt education was provided on exam findings, treatment diagnosis, treatment plan, expectations, and prognosis. Pt was also provided recommendations for activity modifications, possible soreness after evaluation, modalities as needed [ice/heat], pain science education , detrimental fear avoidance behaviors, importance of remaining active, and shoe wear. Pt education regarding current symptoms, discussed ankle/foot pathology, functional anatomy and biomechanics/pathomechanics of the ankle/foot complex, adherence to HEP for optimal outcomes, avoidance of aggravating activities    Patient was instructed in and issued a HEP for:     Access Code: [email or web address]  URL: [email or web address]  Date: 05/14/2024  Prepared by: Erwin De Paz    Exercises  - Supine Ankle Circles  -  1-2 x daily - 7 x weekly - 3 sets - 10 reps  - Long Sitting Ankle Pumps  - 1-2 x daily - 7 x weekly - 3 sets - 10 reps  - Long Sitting Calf Stretch with Strap  - 1-2 x daily - 7 x weekly - 3 sets - 30 seconds hold  - Towel Scrunches  - 1-2 x daily - 7 x weekly - 2 sets - 10 reps    Charges: PT Eval Low Complexity, TherEx 1 unit      Total Timed Treatment: 10 min     Total Treatment Time: 45 min     Based on clinical rationale and outcome measures, this evaluation involved Low Complexity decision making due to 1-2 personal factors/comorbidities, 3 body structures involved/activity limitations, and evolving symptoms including changing pain levels.    PLAN OF CARE:    Goals: (to be met in 10 visits)  Pt will demonstrate improved DF AROM to >= 10 degrees to promote proper foot clearance during gait and greater ease descending stairs without compensation  Pt will have increased ankle strength to 5/5 throughout for improved ankle control with gait and stair negotiation   Pt will establish proper heel to toe gait pattern with less restriction and minimal pain  Pt will have improved SLS to >10s for increased ankle stability with ambulation on uneven surfaces such as gravel and grass   Pt will report <3/10 pain with work and home activities  Pt will be able to ascend/descend 6 inch step with good control and minimal to no pain   Pt will return to gym activities with minimal pain or restriction   Pt will be independent and compliant with comprehensive HEP to maintain progress achieved in PT     Frequency / Duration: Patient will be seen for 2 x/week or a total of 10 visits over a 90 day period. Treatment will include: Gait training, Manual Therapy, Neuromuscular Re-education, Self-Care Home Management, Therapeutic Activities, Therapeutic Exercise, Home Exercise Program instruction, and Modalities if necessary    Education or treatment limitation: None    Rehab Potential:good    LEFS Score  LEFS Score: 46.25 % (5/7/2024  9:23  AM)      Patient/Family/Caregiver was advised of these findings, precautions, and treatment options and has agreed to actively participate in planning and for this course of care.    Thank you for your referral. Please co-sign or sign and return this letter via fax as soon as possible to [phone]. If you have any questions, please contact me at Dept: [phone]    Sincerely,  Electronically signed by therapist: Erwin De Paz PT  Physician's certification required: Yes  I certify the need for these services furnished under this plan of treatment and while under my care.    X___________________________________________________ Date____________________    Certification From: 5/14/2024  To:8/12/2024

## 2024-05-16 NOTE — PROGRESS NOTES
Diagnosis:   Partial tear of ligament of lateral aspect of ankle (S93.499A)  Tendonitis (M77.9)      Referring Provider: Adeola Quintero  Date of Evaluation:    5/14/24    Precautions:  None Next MD visit:   Date of Surgery: n/a   Insurance Primary/Secondary: [place] PPO / N/A     # Auth Visits: 10            20 minutes late     Subjective: has been attempting to walk without boot but uncomfortable and tight    Pain: 3/10      Objective:   See flowsheet      Assessment:   Pt tolerated treatment fairly well.   Swelling noted in ankle still. Was able to complete a few exercises to improving ROM/mobility of the ankle       Goals:   Pt will demonstrate improved DF AROM to >= 10 degrees to promote proper foot clearance during gait and greater ease descending stairs without compensation  Pt will have increased ankle strength to 5/5 throughout for improved ankle control with gait and stair negotiation   Pt will establish proper heel to toe gait pattern with less restriction and minimal pain  Pt will have improved SLS to >10s for increased ankle stability with ambulation on uneven surfaces such as gravel and grass   Pt will report <3/10 pain with work and home activities  Pt will be able to ascend/descend 6 inch step with good control and minimal to no pain   Pt will return to gym activities with minimal pain or restriction   Pt will be independent and compliant with comprehensive HEP to maintain progress achieved in PT     Plan: improve ankle/foot mobility & flexibility, ROM  Date: 5/16/2024  TX#: 2/10 Date:                 TX#: 3/ Date:                 TX#: 4/ Date:                 TX#: 5/ Date:   Tx#: 6/   Manual Therapy (10 min)       Distal tibfib posterior glides, grade 3  STM ankle       TherEx (15 min)       NuStep x 6 min       Long sitting calf stretch (knee straight & bent) 30 sec x 3       Half kneeling DF stretch at wall x 10 (10 second hold)              HEP:   Access Code: [email or web address]  URL:  [email or web address]  Date: 05/14/2024  Prepared by: Erwin De Paz    Exercises  - Supine Ankle Circles  - 1-2 x daily - 7 x weekly - 3 sets - 10 reps  - Long Sitting Ankle Pumps  - 1-2 x daily - 7 x weekly - 3 sets - 10 reps  - Long Sitting Calf Stretch with Strap  - 1-2 x daily - 7 x weekly - 3 sets - 30 seconds hold  - Towel Scrunches  - 1-2 x daily - 7 x weekly - 2 sets - 10 reps    Charges: MT 1; TherEx 1       Total Timed Treatment: 25 min  Total Treatment Time: 25 min

## 2024-05-23 NOTE — PROGRESS NOTES
Diagnosis:   Partial tear of ligament of lateral aspect of ankle (S93.499A)  Tendonitis (M77.9)      Referring Provider: Adeola Quintero  Date of Evaluation:    5/14/24    Precautions:  None Next MD visit:   Date of Surgery: n/a   Insurance Primary/Secondary: [place] P[place] / N/A     # Auth Visits: 10            Subjective: switched to a lace up brace. Still having some pain with bringing foot forward but she knows she is tight    Pain: 1/10      Objective:   See flowsheet      Assessment:   Mild swelling noted. Decreased distal tibfib, TC, subtalar joint mobility - improved with manual treatment   Good tolerance to exercises. Decreased heel to toe gait pattern due to stiffness. Lacking DF and subsequently having some genu recurvatum with terminal stance      Goals:   Pt will demonstrate improved DF AROM to >= 10 degrees to promote proper foot clearance during gait and greater ease descending stairs without compensation  Pt will have increased ankle strength to 5/5 throughout for improved ankle control with gait and stair negotiation   Pt will establish proper heel to toe gait pattern with less restriction and minimal pain  Pt will have improved SLS to >10s for increased ankle stability with ambulation on uneven surfaces such as gravel and grass   Pt will report <3/10 pain with work and home activities  Pt will be able to ascend/descend 6 inch step with good control and minimal to no pain   Pt will return to gym activities with minimal pain or restriction   Pt will be independent and compliant with comprehensive HEP to maintain progress achieved in PT     Plan: improve ankle/foot mobility & flexibility, ROM  Date: 5/16/2024  TX#: 2/10 Date: 5/23/24                TX#: 3/10 Date:                 TX#: 4/ Date:                 TX#: 5/ Date:   Tx#: 6/   Manual Therapy (10 min) Manual Therapy (15 min)      Distal tibfib posterior glides, grade 3  STM ankle STM/MFR calf musculature  Distal tibfib & TC joint posterior  glide, grade 3      TherEx (15 min) TherEx (25 min)      NuStep x 6 min NuStep x 8 min (L5)      Long sitting calf stretch (knee straight & bent) 30 sec x 3 Long sitting calf stretch (knee straight and bent) 1 min x 2       Half kneeling DF stretch at wall x 10 (10 second hold) Rockerboard calf stretch 1 min x 2       Rockerboard AP & ML x 30 each       (R) ankle DF stretch on step x 10 (10 second hold)        HEP review/updated       HEP:   Access Code: [email or web address]  URL: [email or web address]  Date: 05/23/2024  Prepared by: Erwin De Paz    Exercises  - Supine Ankle Circles  - 1-2 x daily - 7 x weekly - 3 sets - 10 reps  - Long Sitting Ankle Pumps  - 1-2 x daily - 7 x weekly - 3 sets - 10 reps  - Long Sitting Calf Stretch with Strap  - 1-2 x daily - 7 x weekly - 3 sets - 30 seconds hold  - Towel Scrunches  - 1-2 x daily - 7 x weekly - 2 sets - 10 reps  - Gastroc Stretch on Wall  - 1-2 x daily - 7 x weekly - 3 sets - 30-45 seconds hold  - Standing Dorsiflexion Self-Mobilization on Step  - 1-2 x daily - 7 x weekly - 10 reps - 10 seconds hold  - Half Kneel Ankle Dorsiflexion Self-Mobilization  - 1-2 x daily - 7 x weekly - 10 reps - 10 seconds hold    Charges: MT 1; TherEx 2      Total Timed Treatment: 40 min  Total Treatment Time: 40 min

## 2024-05-29 NOTE — PROGRESS NOTES
Diagnosis:   Partial tear of ligament of lateral aspect of ankle (S93.499A)  Tendonitis (M77.9)      Referring Provider: Adeola Quintero  Date of Evaluation:    5/14/24    Precautions:  None Next MD visit:   Date of Surgery: n/a   Insurance Primary/Secondary: [place] / N/A     # Auth Visits: 10            Subjective: feels that she has some tendinitis in the ankle/foot; doing exercises still very stiff     Pain: stiffness/10      Objective:   See flowsheet      Assessment:   Good tolerance for exercise. No increases in any pain.  Tight and restricted (R) ankle. Slightly improved heel to toe walking pattern upon ending session  Some swelling noted       Goals:   Pt will demonstrate improved DF AROM to >= 10 degrees to promote proper foot clearance during gait and greater ease descending stairs without compensation  Pt will have increased ankle strength to 5/5 throughout for improved ankle control with gait and stair negotiation   Pt will establish proper heel to toe gait pattern with less restriction and minimal pain  Pt will have improved SLS to >10s for increased ankle stability with ambulation on uneven surfaces such as gravel and grass   Pt will report <3/10 pain with work and home activities  Pt will be able to ascend/descend 6 inch step with good control and minimal to no pain   Pt will return to gym activities with minimal pain or restriction   Pt will be independent and compliant with comprehensive HEP to maintain progress achieved in PT     Plan: improve ankle/foot mobility & flexibility, ROM  Date: 5/16/2024  TX#: 2/10 Date: 5/23/24                TX#: 3/10 Date: 5/29/24                TX#: 4/10 Date:                 TX#: 5/ Date:   Tx#: 6/   Manual Therapy (10 min) Manual Therapy (15 min) Manual Therapy (15 min)     Distal tibfib posterior glides, grade 3  STM ankle STM/MFR calf musculature  Distal tibfib & TC joint posterior glide, grade 3 STM/MFR calf musculature  Distal tibfib & TC joint  posterior glide, grade 3     TherEx (15 min) TherEx (25 min) TherEx (25 min)     NuStep x 6 min NuStep x 8 min (L5) NuStep x 8 min (L5)     Long sitting calf stretch (knee straight & bent) 30 sec x 3 Long sitting calf stretch (knee straight and bent) 1 min x 2  Long sitting calf stretch 1 min x 2     Half kneeling DF stretch at wall x 10 (10 second hold) Rockerboard calf stretch 1 min x 2 Rockerboard calf stretch 1 min x 3      Rockerboard AP & ML x 30 each Rockerboard AP & ML x 30 each      (R) ankle DF stretch on step x 10 (10 second hold)  Ankle strengthening all planes RTB x 20      HEP review/updated  Ankle DF stretch on step x 10 (10 second hold)      HEP:   Access Code: [email or web address]  URL: [email or web address]  Date: 05/23/2024  Prepared by: Erwin De Paz    Exercises  - Supine Ankle Circles  - 1-2 x daily - 7 x weekly - 3 sets - 10 reps  - Long Sitting Ankle Pumps  - 1-2 x daily - 7 x weekly - 3 sets - 10 reps  - Long Sitting Calf Stretch with Strap  - 1-2 x daily - 7 x weekly - 3 sets - 30 seconds hold  - Towel Scrunches  - 1-2 x daily - 7 x weekly - 2 sets - 10 reps  - Gastroc Stretch on Wall  - 1-2 x daily - 7 x weekly - 3 sets - 30-45 seconds hold  - Standing Dorsiflexion Self-Mobilization on Step  - 1-2 x daily - 7 x weekly - 10 reps - 10 seconds hold  - Half Kneel Ankle Dorsiflexion Self-Mobilization  - 1-2 x daily - 7 x weekly - 10 reps - 10 seconds hold    Charges: MT 1; TherEx 2      Total Timed Treatment: 40 min  Total Treatment Time: 40 min

## 2024-05-31 NOTE — PROGRESS NOTES
Diagnosis:   Partial tear of ligament of lateral aspect of ankle (S93.499A)  Tendonitis (M77.9)      Referring Provider: Adeola Quintero  Date of Evaluation:    5/14/24    Precautions:  None Next MD visit:   Date of Surgery: n/a   Insurance Primary/Secondary: [place] / N/A     # Auth Visits: 10            Subjective: ankle feels better but side of foot still painful which happened this past weekend. Reports having tendinitis and that she has had this even before this most recent injury to the ankle    Pain: 3/10 side of foot (ankle is stiff)      Objective:   See flowsheet      Assessment:   Audible popping with PROM & PT mobilization as well as stretching.   Ankle feels better after mobilization however attributes some of her antalgic gait pattern to the side of her foot   Tolerated exercises fairly well with no significant increase in pain. Encouraged to perform HEP, ice, use compression sock and lace up brace if walking around a lot      Goals:   Pt will demonstrate improved DF AROM to >= 10 degrees to promote proper foot clearance during gait and greater ease descending stairs without compensation  Pt will have increased ankle strength to 5/5 throughout for improved ankle control with gait and stair negotiation   Pt will establish proper heel to toe gait pattern with less restriction and minimal pain  Pt will have improved SLS to >10s for increased ankle stability with ambulation on uneven surfaces such as gravel and grass   Pt will report <3/10 pain with work and home activities  Pt will be able to ascend/descend 6 inch step with good control and minimal to no pain   Pt will return to gym activities with minimal pain or restriction   Pt will be independent and compliant with comprehensive HEP to maintain progress achieved in PT     Plan: improve ankle/foot mobility & flexibility, ROM  Date: 5/16/2024  TX#: 2/10 Date: 5/23/24                TX#: 3/10 Date: 5/29/24                TX#: 4/10 Date:  5/31/24                TX#: 5/10 Date:   Tx#: 6/   Manual Therapy (10 min) Manual Therapy (15 min) Manual Therapy (15 min) Manual Therapy (15 min)    Distal tibfib posterior glides, grade 3  STM ankle STM/MFR calf musculature  Distal tibfib & TC joint posterior glide, grade 3 STM/MFR calf musculature  Distal tibfib & TC joint posterior glide, grade 3 STM/MFR calf musculature  Distal tibfib & TC joint posterior glide, grade 3    TherEx (15 min) TherEx (25 min) TherEx (25 min) TherEx (25 min)    NuStep x 6 min NuStep x 8 min (L5) NuStep x 8 min (L5) NuStep x 8 min (L5)    Long sitting calf stretch (knee straight & bent) 30 sec x 3 Long sitting calf stretch (knee straight and bent) 1 min x 2  Long sitting calf stretch 1 min x 2 Wall gastroc stretch 30 sec x 3    Half kneeling DF stretch at wall x 10 (10 second hold) Rockerboard calf stretch 1 min x 2 Rockerboard calf stretch 1 min x 3 Half kneeling ankle mobilization x 15 (10 second hold) - cued for performing exercise with different angles     Rockerboard AP & ML x 30 each Rockerboard AP & ML x 30 each Rockerboard AP & ML tapping x 30 each  Rockerboard AP balance x 1 min     (R) ankle DF stretch on step x 10 (10 second hold)  Ankle strengthening all planes RTB x 20 Tandem stance 30 sec x 2     HEP review/updated  Ankle DF stretch on step x 10 (10 second hold)  SLS 30 sec x 2    HEP:   Access Code: [email or web address]  URL: [email or web address]  Date: 05/23/2024  Prepared by: Erwin De Paz    Exercises  - Supine Ankle Circles  - 1-2 x daily - 7 x weekly - 3 sets - 10 reps  - Long Sitting Ankle Pumps  - 1-2 x daily - 7 x weekly - 3 sets - 10 reps  - Long Sitting Calf Stretch with Strap  - 1-2 x daily - 7 x weekly - 3 sets - 30 seconds hold  - Towel Scrunches  - 1-2 x daily - 7 x weekly - 2 sets - 10 reps  - Gastroc Stretch on Wall  - 1-2 x daily - 7 x weekly - 3 sets - 30-45 seconds hold  - Standing Dorsiflexion Self-Mobilization on Step  - 1-2 x daily - 7 x weekly -  10 reps - 10 seconds hold  - Half Kneel Ankle Dorsiflexion Self-Mobilization  - 1-2 x daily - 7 x weekly - 10 reps - 10 seconds hold    Charges: MT 1; TherEx 2      Total Timed Treatment: 40 min  Total Treatment Time: 40 min

## 2024-06-05 NOTE — PROGRESS NOTES
Diagnosis:   Partial tear of ligament of lateral aspect of ankle (S93.499A)  Tendonitis (M77.9)      Referring Provider: Adeola Quintero  Date of Evaluation:    5/14/24    Precautions:  None Next MD visit:   Date of Surgery: n/a   Insurance Primary/Secondary: [place] / N/A     # Auth Visits: 10            Subjective: feels like most daily activity is improving overall however still has pain on the lateral side of foot    Pain: 3/10 side of foot (ankle is stiff)      Objective:   See flowsheet      Assessment:   Tolerated treatment well. Still soft tissue swelling lateral aspect of foot, lateral & posterior ankle. Discussed continued ice, compression sleeve, elevation, use of lace up brace for comfort and support  Improving ability to translate knee over foot with the DF self mobilization/stretch. Pt continues to demonstrate antalgic gait pattern however less than previous sessions.       Goals:   Pt will demonstrate improved DF AROM to >= 10 degrees to promote proper foot clearance during gait and greater ease descending stairs without compensation  Pt will have increased ankle strength to 5/5 throughout for improved ankle control with gait and stair negotiation   Pt will establish proper heel to toe gait pattern with less restriction and minimal pain  Pt will have improved SLS to >10s for increased ankle stability with ambulation on uneven surfaces such as gravel and grass   Pt will report <3/10 pain with work and home activities  Pt will be able to ascend/descend 6 inch step with good control and minimal to no pain   Pt will return to gym activities with minimal pain or restriction   Pt will be independent and compliant with comprehensive HEP to maintain progress achieved in PT     Plan: improve ankle/foot mobility & flexibility, ROM  Date: 5/16/2024  TX#: 2/10 Date: 5/23/24                TX#: 3/10 Date: 5/29/24                TX#: 4/10 Date:  5/31/24               TX#: 5/10 Date: 6/5/24  Tx#: 6/10    Manual Therapy (10 min) Manual Therapy (15 min) Manual Therapy (15 min) Manual Therapy (15 min) Manual Therapy (25 min)   Distal tibfib posterior glides, grade 3  STM ankle STM/MFR calf musculature  Distal tibfib & TC joint posterior glide, grade 3 STM/MFR calf musculature  Distal tibfib & TC joint posterior glide, grade 3 STM/MFR calf musculature  Distal tibfib & TC joint posterior glide, grade 3 STM/MFR lateral foot, lateral & posterior ankle  Distal tibfib & TC joint posterior glide, grade 3  Distal tibfib taping to provide slight posterior glide    TherEx (15 min) TherEx (25 min) TherEx (25 min) TherEx (25 min) TherEx (15 min)   NuStep x 6 min NuStep x 8 min (L5) NuStep x 8 min (L5) NuStep x 8 min (L5) NuStep x 8 min (L5)   Long sitting calf stretch (knee straight & bent) 30 sec x 3 Long sitting calf stretch (knee straight and bent) 1 min x 2  Long sitting calf stretch 1 min x 2 Wall gastroc stretch 30 sec x 3 Ankle DF mobilization stretch on step x 10 (10 second hold)   Half kneeling DF stretch at wall x 10 (10 second hold) Rockerboard calf stretch 1 min x 2 Rockerboard calf stretch 1 min x 3 Half kneeling ankle mobilization x 15 (10 second hold) - cued for performing exercise with different angles Ankle DF mobilization stretch on step w/ PT providing posterior glide TC joint x 10    Rockerboard AP & ML x 30 each Rockerboard AP & ML x 30 each Rockerboard AP & ML tapping x 30 each  Rockerboard AP balance x 1 min HEP review    (R) ankle DF stretch on step x 10 (10 second hold)  Ankle strengthening all planes RTB x 20 Tandem stance 30 sec x 2     HEP review/updated  Ankle DF stretch on step x 10 (10 second hold)  SLS 30 sec x 2    HEP:   Access Code: [email or web address]  URL: [email or web address]  Date: 05/23/2024  Prepared by: Erwin De Paz    Exercises  - Supine Ankle Circles  - 1-2 x daily - 7 x weekly - 3 sets - 10 reps  - Long Sitting Ankle Pumps  - 1-2 x daily - 7 x weekly - 3 sets - 10 reps  -  Long Sitting Calf Stretch with Strap  - 1-2 x daily - 7 x weekly - 3 sets - 30 seconds hold  - Towel Scrunches  - 1-2 x daily - 7 x weekly - 2 sets - 10 reps  - Gastroc Stretch on Wall  - 1-2 x daily - 7 x weekly - 3 sets - 30-45 seconds hold  - Standing Dorsiflexion Self-Mobilization on Step  - 1-2 x daily - 7 x weekly - 10 reps - 10 seconds hold  - Half Kneel Ankle Dorsiflexion Self-Mobilization  - 1-2 x daily - 7 x weekly - 10 reps - 10 seconds hold    Charges: MT 2; TherEx 1     Total Timed Treatment: 40 min  Total Treatment Time: 40 min

## 2024-06-07 NOTE — PROGRESS NOTES
Diagnosis:   Partial tear of ligament of lateral aspect of ankle (S93.499A)  Tendonitis (M77.9)      Referring Provider: Adeola Quintero  Date of Evaluation:    5/14/24    Precautions:  None Next MD visit:   Date of Surgery: n/a   Insurance Primary/Secondary: [place] / N/A     # Auth Visits: 10            Subjective: Excruciating pain outside part of foot attributing it to the tendinosis. Woke up with it being painful. She had lateral foot pain prior to her most recent injury of ankle that would come and go    Pain: 5/10       Objective:   See flowsheet      Assessment:   Poor tolerance to manual treatment today due to increased pain lateral aspect of foot. Guarded throughout session today due to increased irritability   Does have some swelling to lateral foot and ankle still. Discussed potentially reaching out to physician to update her on her lateral foot issue  Was able to ambulate post session with more tolerance and less pain. Antalgic gait pattern still persist  Significant tenderness to plantar and volar aspect of 5th metatarsal - limited exercise today       Goals:   Pt will demonstrate improved DF AROM to >= 10 degrees to promote proper foot clearance during gait and greater ease descending stairs without compensation  Pt will have increased ankle strength to 5/5 throughout for improved ankle control with gait and stair negotiation   Pt will establish proper heel to toe gait pattern with less restriction and minimal pain  Pt will have improved SLS to >10s for increased ankle stability with ambulation on uneven surfaces such as gravel and grass   Pt will report <3/10 pain with work and home activities  Pt will be able to ascend/descend 6 inch step with good control and minimal to no pain   Pt will return to gym activities with minimal pain or restriction   Pt will be independent and compliant with comprehensive HEP to maintain progress achieved in PT     Plan: improve ankle/foot mobility &  flexibility, ROM  Date: 5/16/2024  TX#: 2/10 Date: 5/23/24                TX#: 3/10 Date: 5/29/24                TX#: 4/10 Date:  5/31/24               TX#: 5/10 Date: 6/5/24  Tx#: 6/10 Date: 6/7/24  Tx#: 7/10   Manual Therapy (10 min) Manual Therapy (15 min) Manual Therapy (15 min) Manual Therapy (15 min) Manual Therapy (25 min) Manual Therapy (25 min)   Distal tibfib posterior glides, grade 3  STM ankle STM/MFR calf musculature  Distal tibfib & TC joint posterior glide, grade 3 STM/MFR calf musculature  Distal tibfib & TC joint posterior glide, grade 3 STM/MFR calf musculature  Distal tibfib & TC joint posterior glide, grade 3 STM/MFR lateral foot, lateral & posterior ankle  Distal tibfib & TC joint posterior glide, grade 3  Distal tibfib taping to provide slight posterior glide  STM to lateral ankle & foot, calf musculature   Distal tibfib posterior glide, grade 3  TC joint posterior glide grade 3   TherEx (15 min) TherEx (25 min) TherEx (25 min) TherEx (25 min) TherEx (15 min) TherEx (15 min)   NuStep x 6 min NuStep x 8 min (L5) NuStep x 8 min (L5) NuStep x 8 min (L5) NuStep x 8 min (L5) NuStep x 5 min (L5)   Long sitting calf stretch (knee straight & bent) 30 sec x 3 Long sitting calf stretch (knee straight and bent) 1 min x 2  Long sitting calf stretch 1 min x 2 Wall gastroc stretch 30 sec x 3 Ankle DF mobilization stretch on step x 10 (10 second hold) Ankle mobilization stretch on step x 10 (10 second hold)  Also w/PT posterior glide   Half kneeling DF stretch at wall x 10 (10 second hold) Rockerboard calf stretch 1 min x 2 Rockerboard calf stretch 1 min x 3 Half kneeling ankle mobilization x 15 (10 second hold) - cued for performing exercise with different angles Ankle DF mobilization stretch on step w/ PT providing posterior glide TC joint x 10 SB calf stretch 30 sec x 3    Rockerboard AP & ML x 30 each Rockerboard AP & ML x 30 each Rockerboard AP & ML tapping x 30 each  Rockerboard AP balance x 1 min HEP  review HEP review    (R) ankle DF stretch on step x 10 (10 second hold)  Ankle strengthening all planes RTB x 20 Tandem stance 30 sec x 2  Ambulation post treatment x 3    HEP review/updated  Ankle DF stretch on step x 10 (10 second hold)  SLS 30 sec x 2     HEP:   Access Code: [email or web address]  URL: [email or web address]  Date: 05/23/2024  Prepared by: Erwin De Paz    Exercises  - Supine Ankle Circles  - 1-2 x daily - 7 x weekly - 3 sets - 10 reps  - Long Sitting Ankle Pumps  - 1-2 x daily - 7 x weekly - 3 sets - 10 reps  - Long Sitting Calf Stretch with Strap  - 1-2 x daily - 7 x weekly - 3 sets - 30 seconds hold  - Towel Scrunches  - 1-2 x daily - 7 x weekly - 2 sets - 10 reps  - Gastroc Stretch on Wall  - 1-2 x daily - 7 x weekly - 3 sets - 30-45 seconds hold  - Standing Dorsiflexion Self-Mobilization on Step  - 1-2 x daily - 7 x weekly - 10 reps - 10 seconds hold  - Half Kneel Ankle Dorsiflexion Self-Mobilization  - 1-2 x daily - 7 x weekly - 10 reps - 10 seconds hold    Charges: MT 2; TherEx 1     Total Timed Treatment: 40 min  Total Treatment Time: 40 min

## 2024-06-12 NOTE — PROGRESS NOTES
Diagnosis:   Partial tear of ligament of lateral aspect of ankle (S93.499A)  Tendonitis (M77.9)      Referring Provider: Adeola Quintero  Date of Evaluation:    5/14/24    Precautions:  None Next MD visit:   Date of Surgery: n/a   Insurance Primary/Secondary: [place] / N/A     # Auth Visits: 10            Subjective: Very sick over this past weekend, rested her ankle a bit and iced. She is walking much better than last week    Pain: 0/10       Objective:   See flowsheet      Assessment:   Pt demonstrates much improved heel to toe walking pattern today upon arrival to clinic. Less swelling noted.   Continues to wear compression sock and perform exercises at home. Ankle mobility, calf flexibility are progressively improving   Able to tolerate progressions in exercises today well without increased soreness  Able to descend 8 inch step with some mild eccentric control deficits and slight restriction in ankle noted       Goals:   Pt will demonstrate improved DF AROM to >= 10 degrees to promote proper foot clearance during gait and greater ease descending stairs without compensation  Pt will have increased ankle strength to 5/5 throughout for improved ankle control with gait and stair negotiation   Pt will establish proper heel to toe gait pattern with less restriction and minimal pain  Pt will have improved SLS to >10s for increased ankle stability with ambulation on uneven surfaces such as gravel and grass   Pt will report <3/10 pain with work and home activities  Pt will be able to ascend/descend 6 inch step with good control and minimal to no pain   Pt will return to gym activities with minimal pain or restriction   Pt will be independent and compliant with comprehensive HEP to maintain progress achieved in PT     Plan: improve ankle/foot mobility & flexibility, ROM  Date: 5/16/2024  TX#: 2/10 Date: 5/23/24                TX#: 3/10 Date: 5/29/24                TX#: 4/10 Date:  5/31/24               TX#: 5/10  Date: 6/5/24  Tx#: 6/10 Date: 6/7/24  Tx#: 7/10 Date: 6/12/24   Manual Therapy (10 min) Manual Therapy (15 min) Manual Therapy (15 min) Manual Therapy (15 min) Manual Therapy (25 min) Manual Therapy (25 min) Manual Therapy NT   Distal tibfib posterior glides, grade 3  STM ankle STM/MFR calf musculature  Distal tibfib & TC joint posterior glide, grade 3 STM/MFR calf musculature  Distal tibfib & TC joint posterior glide, grade 3 STM/MFR calf musculature  Distal tibfib & TC joint posterior glide, grade 3 STM/MFR lateral foot, lateral & posterior ankle  Distal tibfib & TC joint posterior glide, grade 3  Distal tibfib taping to provide slight posterior glide  STM to lateral ankle & foot, calf musculature   Distal tibfib posterior glide, grade 3  TC joint posterior glide grade 3    TherEx (15 min) TherEx (25 min) TherEx (25 min) TherEx (25 min) TherEx (15 min) TherEx (15 min) TherEx (45 min)   NuStep x 6 min NuStep x 8 min (L5) NuStep x 8 min (L5) NuStep x 8 min (L5) NuStep x 8 min (L5) NuStep x 5 min (L5) NuStep x 8 min (L6)   Long sitting calf stretch (knee straight & bent) 30 sec x 3 Long sitting calf stretch (knee straight and bent) 1 min x 2  Long sitting calf stretch 1 min x 2 Wall gastroc stretch 30 sec x 3 Ankle DF mobilization stretch on step x 10 (10 second hold) Ankle mobilization stretch on step x 10 (10 second hold)  Also w/PT posterior glide Long sitting ankle strengthening GTB x 20 all planes    Half kneeling DF stretch at wall x 10 (10 second hold) Rockerboard calf stretch 1 min x 2 Rockerboard calf stretch 1 min x 3 Half kneeling ankle mobilization x 15 (10 second hold) - cued for performing exercise with different angles Ankle DF mobilization stretch on step w/ PT providing posterior glide TC joint x 10 SB calf stretch 30 sec x 3 Rockerboard calf stretch 30 sec x 3    Rockerboard AP & ML x 30 each Rockerboard AP & ML x 30 each Rockerboard AP & ML tapping x 30 each  Rockerboard AP balance x 1 min HEP  review HEP review Rockerboard AP & ML controlled tapping x 20 each & 1 min balance each    (R) ankle DF stretch on step x 10 (10 second hold)  Ankle strengthening all planes RTB x 20 Tandem stance 30 sec x 2  Ambulation post treatment x 3 Rockerboard AP SL balance x 1 min    HEP review/updated  Ankle DF stretch on step x 10 (10 second hold)  SLS 30 sec x 2   Half kneeling ankle DF stretch x 10 (10 second hold)         8 inch FSU/FSD x 10         (DL) heel raise x 10         Shuttle (DL) 125# 2 x 15   HEP:   Access Code: [email or web address]  URL: [email or web address]  Date: 05/23/2024  Prepared by: Erwin De Paz    Exercises  - Supine Ankle Circles  - 1-2 x daily - 7 x weekly - 3 sets - 10 reps  - Long Sitting Ankle Pumps  - 1-2 x daily - 7 x weekly - 3 sets - 10 reps  - Long Sitting Calf Stretch with Strap  - 1-2 x daily - 7 x weekly - 3 sets - 30 seconds hold  - Towel Scrunches  - 1-2 x daily - 7 x weekly - 2 sets - 10 reps  - Gastroc Stretch on Wall  - 1-2 x daily - 7 x weekly - 3 sets - 30-45 seconds hold  - Standing Dorsiflexion Self-Mobilization on Step  - 1-2 x daily - 7 x weekly - 10 reps - 10 seconds hold  - Half Kneel Ankle Dorsiflexion Self-Mobilization  - 1-2 x daily - 7 x weekly - 10 reps - 10 seconds hold    Charges: TherEx 3     Total Timed Treatment: 45 min  Total Treatment Time: 45 min

## 2024-06-27 NOTE — PROGRESS NOTES
[facility] Progress Note    Subjective:   Lelo is here with a new issue for me with her right foot.  Every 7-8 yrs flares  Seems to be tendon related  Otc med in past helps  Since ankle injury can't get rid of it  Ankle is better - now feels stable  Physical therapy was helpful for the ankle        Objective:     Exam right ankle today is pretty stable minimal pain no swelling.    However pretty exquisite pain about the right foot along the fifth ray dorsal extensor tendons pain with inversion of the subtalar joint.  General discomfort about the forefoot.  Foot is mildly edematous.  No warmth  no redness          Imaging: We did go over the MRI results of the right ankle    1. Intermediate to high-grade partial tearing of the ATFL.   2. Trace tenosynovitis and mild peroneus brevis tendinosis. No definitive/discrete tear is appreciated.   3. Moderate tibiotalar and small posterior subtalar joint effusions.       Also new x-rays today of the right foot.  Some disuse osteopenia but otherwise normal x-rays.      Assessment/Plan:     Diagnoses and all orders for this visit:    Right foot pain  -     XR FOOT, COMPLETE (MIN 3 VIEWS), RIGHT (CPT=73630); Future  -     cyclobenzaprine 10 MG Oral Tab; Take 1 tablet (10 mg total) by mouth daily as needed for Muscle spasms.  -     HYDROcodone-acetaminophen (NORCO) 5-325 MG Oral Tab; Take 1 tablet by mouth nightly as needed for Pain.  -     z Insight MRI FOOT, RIGHT (CPT=73718); Future    Tendonitis  -     cyclobenzaprine 10 MG Oral Tab; Take 1 tablet (10 mg total) by mouth daily as needed for Muscle spasms.  -     HYDROcodone-acetaminophen (NORCO) 5-325 MG Oral Tab; Take 1 tablet by mouth nightly as needed for Pain.  -     z Insight MRI FOOT, RIGHT (CPT=73718); Future    Sprain of tibiofibular ligament of right ankle, subsequent encounter      We went over the findings of the MRI of the right ankle and she is doing much better with that ankle.  Maintain exercises from  PT for stability balance and strength    The right foot pain is pretty significant and she has off-and-on swelling.  Will order plain x-ray of the right foot today and if normal findings we will go ahead with an MRI because of the chronic off-and-on again swelling and pain of that foot.  She is having trouble getting her shoe on today because of the discomfort.    Mri right foot is being ordered  An mri of the foot is being ordered to help clarify other pathology.  We are looking for issues such as occult fractures, bone edema, tendonitis, tendon or ligamentous damage, among others.  Pt will follow up to go over MRI results in the office       Has used norco and cyclobenzaprine with some relief at night  Can't use nsaids  Tramadol also been helpful    Rx norco prn nightly  Rx cyclobenzaprine prn nightly  Close risks and benefits of these medications and using only as needed.  Limited number given.        Adeola Quintero, [facility]  [facility]  [address] [place] [address], [id number] [place] [id number]   [address] [place], [place] [id number] [place] [email or web address]  [email or web address]  t: [phone]   f: [phone]            Dragon speech recognition software was used to prepare this note. If a word or phrase is confusing, it is likely do to a failure of recognition. Please contact me with any questions or clarifications.

## 2025-06-04 NOTE — PROGRESS NOTES
Bed: 11  Expected date:   Expected time:   Means of arrival:   Comments:  89 y.o F abnormal lab bed bound 124/palp, 86, 16, 97%   CHIEF COMPLAINT:     Chief Complaint   Patient presents with    Cold     Lingering cough since 5/30, headache, congestion and drainage, low grade fever. - Entered by patient  Dry/wet cough 5 days,  OTC meds taken.with headaches, congestion and lost of taste/smell       HPI:   Amy Rosario is a 34 year old female who presents with complaints of feeling sick for the past 6 days.  Symptoms include nasal congestion, nasal drainage, headache, cough, chest tightness, and yesterday had a low-grade temperature.  The patient reports yesterday and at home temperature of 99.7.  The patient has not had any antipyretics today.  The patient denies ear pain, sore throat, chest pain, shortness of breath, wheezing, abdominal pain, vomiting, diarrhea, and rash.  Patient is tolerating p.o.  The patient reports that she lost her sense of taste and smell this morning.  The patient's last COVID infection was several years ago.    Current Medications[1]   Past Medical History[2]   Social History:  Short Social Hx on File[3]     REVIEW OF SYSTEMS:   GENERAL: See HPI  SKIN: Denies rashes, skin wounds or ulcers.  EYES: Denies blurred vision or double vision  HENT: See HPI  CHEST: Denies chest pain, or palpitations  LUNGS: See HPI  GI: Denies abdominal pain, N/V/C/D.   MUSCULOSKELETAL: no arthralgia or swollen joints  LYMPH:  Denies lymphadenopathy  NEURO: Denies headaches or lightheadedness      EXAM:   /84   Pulse 81   Temp 98.2 °F (36.8 °C) (Oral)   Resp 18   Wt 241 lb 3.2 oz (109.4 kg)   LMP 05/30/2025 (Exact Date)   SpO2 99%   BMI 37.78 kg/m²   GENERAL: well developed, well nourished,in no apparent distress, cooperative   SKIN: no rashes, nosuspicious lesions, no abnormal pigmentation  HEAD: atraumatic, normocephalic  EYES: EOM intact, PERRL.  Conjunctiva normal.  Cornea clear.  Lid margins normal.  No active drainage.  EARS: Right TM normal, + bulging, no retraction, + fluid, bony landmarks normal.  Left TM normal,  no bulging, no retraction, + fluid, bony landmarks normal.    NOSE: nostrils patent, no discharge, nasal mucosa pink and not inflamed.  No sinus tenderness.  THROAT: oral mucosa pink, moist and intact. No visible dental caries. Posterior pharynx without erythema or exudates.  NECK: supple, non-tender.  LUNGS: clear to auscultation bilaterally, no wheezes or rhonchi. Breathing is non labored.  CARDIO: RRR without murmur  GI: No visible scars, or masses. BS's present x4. No palpable masses or hepatosplenomegaly.  Non tender.  No guarding or rebound tenderness  EXTREMITIES: no cyanosis, clubbing or edema.  Homans NEG.  Dorsalis Pedis 2+.  LYMPH:  No lymphadenopathy.    NEURO: A&Ox3.  CN II-XII intact.  No focal deficits.  Coordination and Gait normal.  Kernig and Brudzinski's are negative.    Rapid COVID is NEGATIVE       ASSESSMENT AND PLAN:     ASSESSMENT:  Encounter Diagnosis   Name Primary?    Upper respiratory tract infection, unspecified type Yes       PLAN:    Patient Instructions   Flonase  Antihistamine  Fluids//rest  If return of fever or worsening symptoms seek treatment               [1]   Current Outpatient Medications   Medication Sig Dispense Refill    traMADol 50 MG Oral Tab Take 1 tablet (50 mg total) by mouth every 6 (six) hours as needed for Pain. (Patient not taking: Reported on 6/27/2024)      HYDROcodone-acetaminophen (NORCO) 5-325 MG Oral Tab Take 1 tablet by mouth nightly as needed for Pain. 20 tablet 0    acetaminophen 325 MG Oral Tab Take 1 tablet (325 mg total) by mouth every 6 (six) hours as needed for Pain.      HYDROcodone-acetaminophen 5-325 MG Oral Tab Take 1 tablet by mouth every 6 (six) hours as needed for Pain. (Patient not taking: Reported on 6/27/2024) 15 tablet 0    Multiple Vitamins-Minerals (BIOTIN PLUS/CALCIUM/VIT D3) Oral Tab Take by mouth.      cyanocobalamin 1000 MCG Oral Tab Take 1 tablet (1,000 mcg total) by mouth daily.     [2]   Past Medical History:   PCOS (polycystic  ovarian syndrome)   [3]   Social History  Socioeconomic History    Marital status:    Tobacco Use    Smoking status: Never     Passive exposure: Never    Smokeless tobacco: Never   Vaping Use    Vaping status: Never Used   Substance and Sexual Activity    Alcohol use: Yes     Comment: RARELY    Drug use: Never

## 2025-07-11 NOTE — ED PROVIDER NOTES
Patient Seen in: ward Emergency Department In [place]        History  Chief Complaint   Patient presents with    Flank Pain     Stated Complaint: left flank pain, chills, fever since last night    Subjective:   HPI            Patient presents with left flank pain.  The patient states that it started last night it was pretty quickly severe.  She has had chills and fever since then.  She states the pain is constant.  She denies any vomiting but has had some nausea.  She denies diarrhea.  She does not have any vaginal bleeding or discharge.  Her last menstrual period was over a month ago but is irregular.  She has noticed her urine looked dark but she has not had dysuria or frequency.  She has not really been eating or drinking.  She tried Tylenol for pain without relief.  She rates it at a 9 out of 10.      Objective:     Past Medical History:    PCOS (polycystic ovarian syndrome)              Past Surgical History:   Procedure Laterality Date    Excis lumbar disk,one level                  Social History     Socioeconomic History    Marital status:    Tobacco Use    Smoking status: Never     Passive exposure: Never    Smokeless tobacco: Never   Vaping Use    Vaping status: Never Used   Substance and Sexual Activity    Alcohol use: Yes     Comment: RARELY    Drug use: Never                                Physical Exam    ED Triage Vitals   BP 07/11/25 1629 140/85   Pulse 07/11/25 1623 108   Resp 07/11/25 1623 22   Temp 07/11/25 1623 99.9 °F (37.7 °C)   Temp src 07/11/25 1623 Oral   SpO2 07/11/25 1623 97 %   O2 Device 07/11/25 1623 None (Room air)       Current Vitals:   Vital Signs  BP: 117/73  Pulse: 90  Resp: 15  Temp: 99.6 °F (37.6 °C)  Temp src: Oral    Oxygen Therapy  SpO2: 99 %  O2 Device: None (Room air)            Physical Exam  General: Alert and oriented x3 in moderate distress due to pain.  Cardiovascular: Regular rate and rhythm, no murmurs.  Respiratory: Lungs clear to  auscultation.  Abdomen: Soft, nontender, no rebound or guarding, normal active bowel sounds, + left CVA tenderness.  Extremities: No CCE.  Skin: Warm and dry.      ED Course  Labs Reviewed   CBC WITH DIFFERENTIAL WITH PLATELET - Abnormal; Notable for the following components:       Result Value    WBC 20.9 (*)     Neutrophil Absolute Prelim 17.43 (*)     Neutrophil Absolute 17.43 (*)     Monocyte Absolute 1.23 (*)     All other components within normal limits   COMP METABOLIC PANEL (14) - Abnormal; Notable for the following components:    Glucose 107 (*)     Sodium 135 (*)     Bilirubin, Total 1.5 (*)     Albumin 5.1 (*)     All other components within normal limits   URINALYSIS, ROUTINE - Abnormal; Notable for the following components:    Clarity Urine Cloudy (*)     Ketones Urine >=160 (*)     Blood Urine Large (*)     Protein Urine 100 mg/dL (*)     Nitrite Urine Positive (*)     Leukocyte Esterase Urine Trace (*)     All other components within normal limits   UA MICROSCOPIC ONLY, URINE - Abnormal; Notable for the following components:    WBC Urine 11-20 (*)     RBC Urine >10 (*)     Bacteria Urine 3+ (*)     Squamous Epi. Cells Few (*)     All other components within normal limits   POCT GLUCOSE - Abnormal; Notable for the following components:    POC Glucose 107 (*)     All other components within normal limits   POCT ISTAT CHEM8 CARTRIDGE - Abnormal; Notable for the following components:    ISTAT Glucose 108 (*)     All other components within normal limits   LIPASE - Normal   LACTIC ACID, PLASMA - Normal   POCT PREGNANCY URINE - Normal   POCT PREGNANCY URINE - Normal   BLOOD CULTURE   BLOOD CULTURE        CT ABDOMEN+PELVIS(CONTRAST ONLY)(CPT=74177)  Result Date: 7/11/2025  PROCEDURE: CT ABDOMEN+PELVIS(CONTRAST ONLY)(CPT=74177) INDICATIONS: left flank pain, chills, fever since last night PATIENT STATED HISTORY: Patient having left flank pain, fever and chills since last night. COMPARISON: There are no  comparisons for this exam. TECHNIQUE:  Noncontrast and arterial phase CT scans were obtained from the just above the dome of the diaphragm through just below the ischium. MIP reconstructed images of the arteries are also submitted for interpretation, obtained from the post contrast exam. CONTRAST: IOPAMIDOL 76% IV SOLN FOR POWER INJECTOR:100 mL FINDINGS: ARTERIAL VASCULATURE: Patent aortobiiliac arterial vasculature without evidence of significant stenosis or aneurysm. Widely patent mesenteric and renal arteries. LIVER: No significant focal lesion. Normal morphology. BILIARY: No visible dilatation or calcification. PANCREAS: No lesion, fluid collection, ductal dilatation, or atrophy. SPLEEN: No enlargement or focal lesion. ADRENALS: No mass or enlargement. KIDNEYS: There is no hydronephrosis. No right-sided nephrolithiasis.. No significant abnormality within the right kidney. There is a 3 mm triangular nonobstructing calcification lower pole left kidney. There is a 2 x 2 cm area of decreased enhancement/perfusion of the midpole the left kidney has the appearance of pyelonephritis. Minimal mucosal thickening of the left pelvic calyceal system and portions of the ureter. No hydroureter noted. VENOUS VASCULAR: Not assessed on this noncontrast and arterial phase exam. RETROPERITONEUM: No mass or enlarged adenopathy. BOWEL/MESENTERY: No free air. No free fluid. No bowel obstruction. Normal appendix. There is some mild wall thickening and fluid within the proximal jejunum suggesting either mild ileus or enteritis. No lymphadenopathy. ABDOMINAL WALL: No significant findings. URINARY BLADDER: Mild bladder wall thickening without mass or calcification. PELVIC NODES: No adenopathy. PELVIC ORGANS: No visible mass. Pelvic organs appropriate for patient age. BONES: Moderate to severe degenerative changes involving L5-S1. LUNG BASES: No visible pulmonary or pleural disease. OTHER: Negative.     CONCLUSION: There is mild diffuse  bladder wall thickening. There is a 3 mm nonobstructing calculus lower pole left kidney. There is a 2 cm area of probable pyelonephritis within the midpole the left kidney. There is some epithelial thickening which is mild in the left pelvic calyceal system and proximal ureter suggesting a sending ureteritis. Mild small bowel ileus suspected. Electronically Verified and Signed by Attending Radiologist: Tobi Martin MD 7/11/2025 7:44 PM Workstation: [id number]    The patient was given Dilaudid with Zofran for pain and started on IV fluids.  She spiked a fever and was given Tylenol.  She was started on Rocephin for her pyelonephritis.  She did require repeat dosing of pain medication for persistent pain.                MDM     Patient presents with left abdomen/flank pain.  Differential diagnosis includes but is not limited to acute pyelonephritis, sepsis, ureterolithiasis, acute diverticulitis and acute colitis.  The patient's CT looks consistent with pyelonephritis.  She does not have an obstructing stone.  Her UA does look infected and she has significant leukocytosis.  Her markers for sepsis are negative.  Her chemistry panel is unremarkable other than mildly elevated bilirubin level.  Given the severity of the patient's symptoms she will be admitted for IV antibiotics and pain control.  I discussed her case with Dr. Rodriguez from the hospitalist service who has agreed to admit her primarily.    Admission disposition: 7/11/2025  8:35 PM           Medical Decision Making      Disposition and Plan     Clinical Impression:  1. Acute pyelonephritis         Disposition:  Admit  7/11/2025  8:35 pm    Follow-up:  No follow-up provider specified.        Medications Prescribed:  Current Discharge Medication List                Supplementary Documentation:         Hospital Problems       Present on Admission  Date Reviewed: 6/4/2025          ICD-10-CM Noted POA    * (Principal) Acute pyelonephritis N10 7/11/2025  Unknown    Hyperglycemia R73.9 7/11/2025 Yes    Hyponatremia E87.1 7/11/2025 Yes    Leukocytosis D72.829 7/11/2025 Yes

## 2025-07-11 NOTE — ED INITIAL ASSESSMENT (HPI)
Patient here with left flank pain that radiates to front of abdomen. States pain started last night. Also notes fever and chills, tmax 100.2.

## 2025-07-12 NOTE — PROGRESS NOTES
[facility]   part of [facility]     Hospitalist Progress Note     Amy Rosario Patient Status:  Inpatient    1990 MRN [id number]   Location [facility] 4NW-A Attending Eduardo Hutson*   Hosp Day # 1 PCP None Pcp     Chief Complaint: Left flank pain    Subjective:     Patient feeling drowsy from Norco,   Constipated x 2 days ~ but no abdominal discomfort, Nausea    Objective:    Review of Systems:   A comprehensive review of systems was completed; pertinent positive and negatives stated in subjective.    Vital signs:  Temp:  [98.3 °F (36.8 °C)-102.9 °F (39.4 °C)] 100.7 °F (38.2 °C)  Pulse:  [] 95  Resp:  [15-22] 16  BP: (114-140)/() 116/63  SpO2:  [95 %-100 %] 100 %    Physical Exam:    General: No acute distress  Respiratory: No wheezes, no rhonchi  Cardiovascular: S1, S2, regular rate and rhythm  Abdomen: Soft, Non-tender, non-distended, positive bowel sounds  Neuro: No new focal deficits.   Extremities: No edema      Diagnostic Data:    Labs:  Recent Labs   Lab 25  1630 25  0508   WBC 20.9* 17.8*   HGB 13.8 12.3   MCV 82.8 83.7   .0 229.0       Recent Labs   Lab 25  1630 25  0508   * 102*   BUN 10 7*   CREATSERUM 0.87 0.85   CA 9.3 8.7   ALB 5.1* 4.5   * 137   K 3.7 3.8    104   CO2 22.0 23.0   ALKPHO 65 58   AST 16 16   ALT 17 15   BILT 1.5* 1.3*   TP 8.1 7.4       Estimated Glomerular Filtration Rate: 93 mL/min/1.73m2 (result from lab).    No results for input(s): \"TROP\", \"TROPHS\", \"CK\" in the last 168 hours.    No results for input(s): \"PTP\", \"INR\" in the last 168 hours.               Microbiology    No results found for this visit on 25.      Imaging: Reviewed in Epic.    Medications:    cefTRIAXone  2 g Intravenous Q24H    cyanocobalamin  1,000 mcg Oral Daily       Assessment & Plan:      #Sepsis 2/2 Left sided Pyelonephritis   -without evidence of end organ dysfunction   -Ceftriaxone  -Blood and Urine  Cx pending   -IVF  -pain control    #nonobstructing left sided 3 mm stone  -strain urine  -IVF    #Constipation/Ileus  -Diet as tolerated  -limit opioids  -Bowel regimen     Marcial Dumont DO    Supplementary Documentation:     Quality:  DVT Mechanical Prophylaxis:        DVT Pharmacologic Prophylaxis   Medication   None                Code Status: Not on file  Cullen: No urinary catheter in place      The 21st Century Cures Act makes medical notes like these available to patients in the interest of transparency. Please be advised this is a medical document. Medical documents are intended to carry relevant information, facts as evident, and the clinical opinion of the practitioner. The medical note is intended as peer to peer communication and may appear blunt or direct. It is written in medical language and may contain abbreviations or verbiage that are unfamiliar.

## 2025-07-12 NOTE — ED QUICK NOTES
Spoke with Thao GREY on med onc, room is clean and ready. Pt driving there via private vehicle with . IV intact and patent.

## 2025-07-12 NOTE — PLAN OF CARE
NURSING ADMISSION NOTE    Patient admitted via Cart  Oriented to room.  Safety precautions initiated.  Bed in low position.    Pt received from [facility]. Pt a/o x4. Tmax 100.2. Other VSS. C/o HA. Meds per MAR.     Call light within reach.

## 2025-07-12 NOTE — PLAN OF CARE
Assumed patient care this morning.   Alert, awake. Breathing unlabored. C/o mild pain to left flank, improving per patient. Voiding without difficulty. Encouraged to maintain adequate oral intake.

## 2025-07-12 NOTE — H&P
[facility]     Amy Rosario Patient Status:  Inpatient    1990 MRN [id number]   Location [facility] 4NW-A Attending Eduardo Hutson*   Hosp Day # 0 PCP None Pcp     Chief Complaint: L flank pain    Subjective:    History of Present Illness:     Amy Rosario is a 34 year old female with PMHx PCOS who presented to the hospital for left flank pain. Her symptoms began 2 days ago with a dull pressure pain which becomes sharp with movement rated 6-7/10. She had some relief with IV pain medication. She tried taking tylenol at home without relief. She had chills and fever at home but denied any dysuria, polyuria, urinary urgency.    History/Other:    Past Medical History:  Past Medical History[1]  Past Surgical History:   Past Surgical History[2]   Family History:   Family History[3]  Social History:    reports that she has never smoked. She has never been exposed to tobacco smoke. She has never used smokeless tobacco. She reports current alcohol use. She reports that she does not use drugs.     Allergies: Allergies[4]    Medications:  Medications Ordered Prior to Encounter[5]    Review of Systems:   A comprehensive review of systems was completed.    Pertinent positives and negatives noted in the HPI.    Objective:   Physical Exam:    /71 (BP Location: Right arm)   Pulse 91   Temp 99.2 °F (37.3 °C) (Oral)   Resp 16   Ht 5' 7\" (1.702 m)   Wt 230 lb (104.3 kg)   LMP 2025 (Approximate)   SpO2 95%   BMI 36.02 kg/m²   General: No acute distress, Alert  Respiratory: No rhonchi, no wheezes  Cardiovascular: S1, S2. Regular rate and rhythm  Abdomen: Soft, Non-tender, non-distended, positive bowel sounds  Neuro: No new focal deficits  Extremities: No edema      Results:    Labs:      Labs Last 24 Hours:    Recent Labs   Lab 25  1630   RBC 4.93   HGB 13.8   HCT 40.8   MCV 82.8   MCH 28.0   MCHC 33.8   RDW 15.2   NEPRELIM 17.43*   WBC 20.9*    .0       Recent Labs   Lab 07/11/25  1630 07/11/25  1650   *  --    BUN 10  --    CREATSERUM 0.87  --    EGFRCR 91 87   CA 9.3  --    ALB 5.1*  --    *  --    K 3.7  --      --    CO2 22.0  --    ALKPHO 65  --    AST 16  --    ALT 17  --    BILT 1.5*  --    TP 8.1  --        Estimated Glomerular Filtration Rate: 87 mL/min/1.73m2 (result from lab).    No results found for: \"PT\", \"INR\"    No results for input(s): \"TROP\", \"TROPHS\", \"CK\" in the last 168 hours.    No results for input(s): \"TROP\", \"PBNP\" in the last 168 hours.    No results for input(s): \"PCT\" in the last 168 hours.    Imaging: Imaging data reviewed in Epic.    Assessment & Plan:      #Sepsis 2/2 pyelonephritis  #leukocytosis  #hyperbilirubinemia  -Tbili 1.5  -UA showing large blood, +nitrite, trace leuk est, 11-20 WBC, >10 RBC, 3+ bacteria, few epi cells  -CT showing mild diffuse bladder wall thickening, 32cm area probable pyelonephritis in lifet kidney, mild small bowel ileus is suspected  -met 4 SIRS criteria: T 102.9F, , RR 22, WBC 20.9 -->lactate 0.9  -blood cx pending  -order urine cx  -antibiotics: rocephin  -pain control: tylenol, norco  -cont to trend CBC, LFTs      Plan of care discussed with ED physician    Angela Weir DO    Supplementary Documentation:     The 21st Century Cures Act makes medical notes like these available to patients in the interest of transparency. Please be advised this is a medical document. Medical documents are intended to carry relevant information, facts as evident, and the clinical opinion of the practitioner. The medical note is intended as peer to peer communication and may appear blunt or direct. It is written in medical language and may contain abbreviations or verbiage that are unfamiliar.                                       [1]   Past Medical History:   PCOS (polycystic ovarian syndrome)   [2]   Past Surgical History:  Procedure Laterality Date    Excis lumbar disk,one  level     [3] No family history on file.  [4]   Allergies  Allergen Reactions    Lactase-Rennet DIARRHEA and NAUSEA AND VOMITING    Ibuprofen OTHER (SEE COMMENTS)     Nose bleeds     Gluten Meal NAUSEA ONLY   [5]   No current facility-administered medications on file prior to encounter.     Current Outpatient Medications on File Prior to Encounter   Medication Sig Dispense Refill    acetaminophen 325 MG Oral Tab Take 1 tablet (325 mg total) by mouth every 6 (six) hours as needed for Pain.      traMADol 50 MG Oral Tab Take 1 tablet (50 mg total) by mouth every 6 (six) hours as needed for Pain. (Patient not taking: Reported on 6/27/2024)      HYDROcodone-acetaminophen (NORCO) 5-325 MG Oral Tab Take 1 tablet by mouth nightly as needed for Pain. 20 tablet 0    HYDROcodone-acetaminophen 5-325 MG Oral Tab Take 1 tablet by mouth every 6 (six) hours as needed for Pain. (Patient not taking: Reported on 6/27/2024) 15 tablet 0    Multiple Vitamins-Minerals (BIOTIN PLUS/CALCIUM/VIT D3) Oral Tab Take by mouth.      cyanocobalamin 1000 MCG Oral Tab Take 1 tablet (1,000 mcg total) by mouth daily.

## 2025-07-13 NOTE — PLAN OF CARE
Patient voiding zachary, cloudy urine, c/o severe pain on left flank. Vital signs monitored.   Problem: RISK FOR INFECTION - ADULT  Goal: Absence of fever/infection during anticipated neutropenic period  Description: INTERVENTIONS  - Monitor WBC  - Administer growth factors as ordered  - Implement neutropenic guidelines  Outcome: Progressing   Temp max 101.7. Norco given as needed. Urine strained, no stone noted. Dr. Rodriguez notified regarding fever.

## 2025-07-13 NOTE — PROGRESS NOTES
[facility]   part of [facility]     Hospitalist Progress Note     Amy Rosario Patient Status:  Inpatient    1990 MRN [id number]   Location [facility] 4NW-A Attending Eduardo Hutson*   Hosp Day # 2 PCP None Pcp     Chief Complaint: Left flank pain    Subjective:     Pain controlled with Norco, no BM but passing gas  Fever this afternoon     Objective:    Review of Systems:   A comprehensive review of systems was completed; pertinent positive and negatives stated in subjective.    Vital signs:  Temp:  [98.3 °F (36.8 °C)-102.7 °F (39.3 °C)] 98.4 °F (36.9 °C)  Pulse:  [75-96] 92  Resp:  [14-16] 16  BP: (114-123)/(69-79) 123/72  SpO2:  [100 %] 100 %    Physical Exam:    General: No acute distress  Respiratory: No wheezes, no rhonchi  Cardiovascular: S1, S2, regular rate and rhythm  Abdomen: Soft, Non-tender, non-distended, positive bowel sounds  Neuro: No new focal deficits.   Extremities: No edema      Diagnostic Data:    Labs:  Recent Labs   Lab 25  1630 25  0508 25  0650   WBC 20.9* 17.8* 12.3*   HGB 13.8 12.3 11.9*   MCV 82.8 83.7 83.1   .0 229.0 217.0       Recent Labs   Lab 25  1630 25  0508 25  0650   * 102* 88   BUN 10 7* 7*   CREATSERUM 0.87 0.85 0.75   CA 9.3 8.7 8.3*   ALB 5.1* 4.5 4.0   * 137 136   K 3.7 3.8 4.2    104 104   CO2 22.0 23.0 24.0   ALKPHO 65 58 56   AST 16 16 19   ALT 17 15 17   BILT 1.5* 1.3* 0.5   TP 8.1 7.4 6.8       Estimated Glomerular Filtration Rate: 108 mL/min/1.73m2 (result from lab).    No results for input(s): \"TROP\", \"TROPHS\", \"CK\" in the last 168 hours.    No results for input(s): \"PTP\", \"INR\" in the last 168 hours.               Microbiology    Hospital Encounter on 25   1. Blood Culture     Status: None (Preliminary result)    Collection Time: 25  6:05 PM    Specimen: Blood,peripheral   Result Value Ref Range    Blood Culture Result No Growth 1 Day N/A   2. Urine  Culture, Routine     Status: None    Collection Time: 07/11/25  5:14 PM    Specimen: Urine, clean catch   Result Value Ref Range    Urine Culture  N/A     >100,000 cfu/ml Three or more species of Gram negative bacilli; none predominant. No further workup performed.         Imaging: Reviewed in Epic.    Medications:    cefTRIAXone  2 g Intravenous Q24H    polyethylene glycol (PEG 3350)  17 g Oral Daily    cyanocobalamin  1,000 mcg Oral Daily       Assessment & Plan:      #Sepsis 2/2 Left sided Pyelonephritis   -without evidence of end organ dysfunction   -Ceftriaxone  -Blood Cx NGTD and Urine Cx no predominant, will call lab   -IVF  -pain control    #nonobstructing left sided 3 mm stone  -strain urine    #Constipation/Ileus  -Diet as tolerated  -limit opioids  -Bowel regimen     Marcial Dumont DO    Supplementary Documentation:     Quality:  DVT Mechanical Prophylaxis:        DVT Pharmacologic Prophylaxis   Medication   None                Code Status: Not on file  Cullen: No urinary catheter in place      The 21st Century Cures Act makes medical notes like these available to patients in the interest of transparency. Please be advised this is a medical document. Medical documents are intended to carry relevant information, facts as evident, and the clinical opinion of the practitioner. The medical note is intended as peer to peer communication and may appear blunt or direct. It is written in medical language and may contain abbreviations or verbiage that are unfamiliar.

## 2025-07-13 NOTE — PLAN OF CARE
1552-Pt arrived from OBGYN office after having mild range BP. Pt denies HA, visual changes, swelling, or RUQ pain. Pt denies LOF, vaginal bleeding, or contractions and reports good FM. Plan of care discussed, urine and blood work collected. RN to update pt with results.     1728-RN updated MD Granado on vitals, FHT, and lab results. MD gave order to discharge home with pre-e and  labor precautions.     1730-Detailed discharge instructions reviewed with pt. Pt and partner verbalized understanding.     1738-Pt ambulated off unit in no apparent distress or discomfort.    Pt a/o x4. Tmax 100.2. Other VSS on RA. C/o pain. Meds per MAR. IVF per order. Urine strained, no stone seen.     Call light within reach.

## 2025-07-14 NOTE — PROGRESS NOTES
Patient discharged to home explained all medications and follow up appt. Patient verbalized her understanding of teaching.

## 2025-07-14 NOTE — PAYOR COMM NOTE
--------------  ADMISSION REVIEW     Payor: [employer]  Subscriber #:  [id number]  Authorization Number: [id number]    Admit date: 7/11/25  Admit time: 10:43 PM     REVIEW DOCUMENTATION:  ED Provider Notes signed by Kelsey Quezada MD at 7/11/2025 10:44 PM       Author: Kelsey Quezada MD Service: Emergency Medicine Author Type: Physician    Filed: 7/11/2025 10:44 PM Date of Service: 7/11/2025  4:40 PM Status: Signed     Patient Seen in: [facility] [place]    History  Chief Complaint   Patient presents with    Flank Pain     Stated Complaint: left flank pain, chills, fever since last night    HPI  Patient presents with left flank pain.  The patient states that it started last night it was pretty quickly severe.  She has had chills and fever since then.  She states the pain is constant.  She denies any vomiting but has had some nausea.  She denies diarrhea.  She does not have any vaginal bleeding or discharge.  Her last menstrual period was over a month ago but is irregular.  She has noticed her urine looked dark but she has not had dysuria or frequency.  She has not really been eating or drinking.  She tried Tylenol for pain without relief.  She rates it at a 9 out of 10.    Physical Exam  ED Triage Vitals   BP 07/11/25 1629 140/85   Pulse 07/11/25 1623 108   Resp 07/11/25 1623 22   Temp 07/11/25 1623 99.9 °F (37.7 °C)   Temp src 07/11/25 1623 Oral   SpO2 07/11/25 1623 97 %   O2 Device 07/11/25 1623 None (Room air)     Vital Signs  BP: 117/73  Pulse: 90  Resp: 15  Temp: 99.6 °F (37.6 °C)  Temp src: Oral    Oxygen Therapy  SpO2: 99 %  O2 Device: None (Room air)    Physical Exam  General: Alert and oriented x3 in moderate distress due to pain.  Cardiovascular: Regular rate and rhythm, no murmurs.  Respiratory: Lungs clear to auscultation.  Abdomen: Soft, nontender, no rebound or guarding, normal active bowel sounds, + left CVA tenderness.  Extremities: No CCE.  Skin: Warm and  dry.    ED Course  Labs Reviewed   CBC WITH DIFFERENTIAL WITH PLATELET - Abnormal; Notable for the following components:       Result Value    WBC 20.9 (*)     Neutrophil Absolute Prelim 17.43 (*)     Neutrophil Absolute 17.43 (*)     Monocyte Absolute 1.23 (*)     All other components within normal limits   COMP METABOLIC PANEL (14) - Abnormal; Notable for the following components:    Glucose 107 (*)     Sodium 135 (*)     Bilirubin, Total 1.5 (*)     Albumin 5.1 (*)     All other components within normal limits   URINALYSIS, ROUTINE - Abnormal; Notable for the following components:    Clarity Urine Cloudy (*)     Ketones Urine >=160 (*)     Blood Urine Large (*)     Protein Urine 100 mg/dL (*)     Nitrite Urine Positive (*)     Leukocyte Esterase Urine Trace (*)     All other components within normal limits   UA MICROSCOPIC ONLY, URINE - Abnormal; Notable for the following components:    WBC Urine 11-20 (*)     RBC Urine >10 (*)     Bacteria Urine 3+ (*)     Squamous Epi. Cells Few (*)     All other components within normal limits   POCT GLUCOSE - Abnormal; Notable for the following components:    POC Glucose 107 (*)     All other components within normal limits   POCT ISTAT CHEM8 CARTRIDGE - Abnormal; Notable for the following components:    ISTAT Glucose 108 (*)     All other components within normal limits   LIPASE - Normal   LACTIC ACID, PLASMA - Normal   POCT PREGNANCY URINE - Normal   BLOOD CULTURE   BLOOD CULTURE     CT ABDOMEN+PELVIS(CONTRAST ONLY)(CPT=74177)  Result Date: 7/11/2025  CONCLUSION: There is mild diffuse bladder wall thickening. There is a 3 mm nonobstructing calculus lower pole left kidney. There is a 2 cm area of probable pyelonephritis within the midpole the left kidney. There is some epithelial thickening which is mild in the left pelvic calyceal system and proximal ureter suggesting a sending ureteritis. Mild small bowel ileus suspected.     The patient was given Dilaudid with Zofran for  pain and started on IV fluids.  She spiked a fever and was given Tylenol.  She was started on Rocephin for her pyelonephritis.  She did require repeat dosing of pain medication for persistent pain.    MDM  Patient presents with left abdomen/flank pain.  Differential diagnosis includes but is not limited to acute pyelonephritis, sepsis, ureterolithiasis, acute diverticulitis and acute colitis.  The patient's CT looks consistent with pyelonephritis.  She does not have an obstructing stone.  Her UA does look infected and she has significant leukocytosis.  Her markers for sepsis are negative.  Her chemistry panel is unremarkable other than mildly elevated bilirubin level.  Given the severity of the patient's symptoms she will be admitted for IV antibiotics and pain control.  I discussed her case with Dr. Rodriguez from the hospitalist service who has agreed to admit her primarily.    Admission disposition: 7/11/2025  8:35 PM  Medical Decision Making  Disposition and Plan     Clinical Impression:  1. Acute pyelonephritis       Disposition:  Admit  7/11/2025  8:35 pm    Kelsey Quezada MD on 7/11/2025 10:44 PM      History and Physical   Chief Complaint: L flank pain    History of Present Illness:   34 year old female with PMHx PCOS who presented to the hospital for left flank pain. Her symptoms began 2 days ago with a dull pressure pain which becomes sharp with movement rated 6-7/10. She had some relief with IV pain medication. She tried taking tylenol at home without relief. She had chills and fever at home but denied any dysuria, polyuria, urinary urgency.    Lab 07/11/25  1630   RBC 4.93   HGB 13.8   HCT 40.8   MCV 82.8   MCH 28.0   MCHC 33.8   RDW 15.2   NEPRELIM 17.43*   WBC 20.9*   .0     Lab 07/11/25  1630 07/11/25  1650   *  --    BUN 10  --    CREATSERUM 0.87  --    EGFRCR 91 87   CA 9.3  --    ALB 5.1*  --    *  --    K 3.7  --      --    CO2 22.0  --    ALKPHO 65  --    AST 16  --     ALT 17  --    BILT 1.5*  --    TP 8.1  --        Assessment & Plan:  #Sepsis 2/2 pyelonephritis  #leukocytosis  #hyperbilirubinemia  -Tbili 1.5  -UA showing large blood, +nitrite, trace leuk est, 11-20 WBC, >10 RBC, 3+ bacteria, few epi cells  -CT showing mild diffuse bladder wall thickening, 32cm area probable pyelonephritis in left kidney, mild small bowel ileus is suspected  -met 4 SIRS criteria: T 102.9F, , RR 22, WBC 20.9 -->lactate 0.9  -blood cx pending  -order urine cx  -antibiotics: rocephin  -pain control: tylenol, norco  -cont to trend CBC, LFTs     7/12/2025  Hospitalist Progress Note   feeling drowsy from Norco,   Constipated x 2 days ~ but no abdominal discomfort, Nausea  -102.9 °F (39.4 °C)] 100.7 °F (38.2 °C)     Lab 07/11/25  1630 07/12/25  0508   WBC 20.9* 17.8*   HGB 13.8 12.3   MCV 82.8 83.7   .0 229.0      * 102*   BUN 10 7*   CREATSERUM 0.87 0.85   CA 9.3 8.7   ALB 5.1* 4.5   * 137   K 3.7 3.8    104   CO2 22.0 23.0   ALKPHO 65 58   AST 16 16   ALT 17 15   BILT 1.5* 1.3*   TP 8.1 7.4     Assessment & Plan:  #Sepsis 2/2 Left sided Pyelonephritis   -without evidence of end organ dysfunction   -Ceftriaxone  -Blood and Urine Cx pending   -IVF  -pain control     #nonobstructing left sided 3 mm stone  -strain urine  -IVF    #Constipation/Ileus  -Diet as tolerated  -limit opioids  -Bowel regimen      7/13/2025  Hospitalist Progress Note   Pain controlled with Norco, no BM but passing gas  Fever this afternoon   102.7 °F     Lab 07/11/25  1630 07/12/25  0508 07/13/25  0650   WBC 20.9* 17.8* 12.3*   HGB 13.8 12.3 11.9*   MCV 82.8 83.7 83.1   .0 229.0 217.0      * 102* 88   BUN 10 7* 7*   CREATSERUM 0.87 0.85 0.75   CA 9.3 8.7 8.3*   ALB 5.1* 4.5 4.0   * 137 136   K 3.7 3.8 4.2    104 104   CO2 22.0 23.0 24.0   ALKPHO 65 58 56   AST 16 16 19   ALT 17 15 17   BILT 1.5* 1.3* 0.5   TP 8.1 7.4 6.8     Assessment & Plan:  #Sepsis 2/2 Left sided  Pyelonephritis   -without evidence of end organ dysfunction   -Ceftriaxone  -Blood Cx NGTD and Urine Cx no predominant, will call lab   -IVF  -pain control     #nonobstructing left sided 3 mm stone  -strain urine    #Constipation/Ileus  -Diet as tolerated  -limit opioids  -Bowel regimen     MEDICATIONS ADMINISTERED:  acetaminophen (Tylenol) tab 650 mg       Date Action Dose Route User    7/13/2025 2058 Given 650 mg Oral Blank Quick RN          cefTRIAXone (Rocephin) 2 g in sodium chloride 0.9% 100mL IVPB-SANDRO       Date Action Dose Route User    7/13/2025 1723 New Bag 2 g Intravenous Jori Bowles RN          HYDROcodone-acetaminophen (Norco) 5-325 MG per tab 1 tablet       Date Action Dose Route User    7/14/2025 1141 Given 1 tablet Oral Radha Kc RN    7/14/2025 0439 Given 1 tablet Oral Blank Quick RN    7/13/2025 1903 Given 1 tablet Oral Jori Bowles RN          polyethylene glycol (PEG 3350) (Miralax) 17 g oral packet 17 g       Date Action Dose Route User    7/14/2025 0947 Given 17 g Oral Radha Kc RN          cyanocobalamin (Vitamin B12) tab 1,000 mcg       Date Action Dose Route User    7/14/2025 0947 Given 1,000 mcg Oral Radha Kc RN          Vitals       Date/Time Temp Pulse Resp BP SpO2 Weight O2 Device O2 Flow Rate (L/min) [facility]    07/14/25 1130 98.7 °F (37.1 °C) 72 18 119/73 100 % -- None (Room air) --     07/14/25 0433 98.5 °F (36.9 °C) 69 18 114/70 100 % -- None (Room air) --     07/14/25 0019 98.4 °F (36.9 °C) -- -- -- -- -- -- --     07/13/25 2053 100.9 °F (38.3 °C) 91 18 122/76 100 % -- None (Room air) --     07/13/25 1256 98.4 °F (36.9 °C) -- -- -- -- -- -- --     07/13/25 1145 101.7 °F (38.7 °C) 92 16 123/72 100 % -- None (Room air) -- AL    07/13/25 0500 98.3 °F (36.8 °C) 75 14 116/69 100 % -- None (Room air) -- LP     07/12/25 2216 99 °F (37.2 °C) -- -- -- -- -- -- -- AM   07/12/25 2032 100.2 °F (37.9 °C) 93 14 114/69 100 % -- None (Room air)  -- LP   07/12/25 1850 102.7 °F (39.3 °C) Abnormal  96 -- 123/79 100 % -- None (Room air) --    07/12/25 1721 101 °F (38.3 °C) Abnormal  -- -- -- -- -- -- --    07/12/25 1247 100.1 °F (37.8 °C) -- -- -- -- -- -- --    07/12/25 1159 100.7 °F (38.2 °C) Abnormal  95 16 116/63 100 % -- None (Room air) -- AL   07/12/25 1050 98.8 °F (37.1 °C) -- -- -- -- -- -- --    07/12/25 0655 98.3 °F (36.8 °C) -- -- -- -- -- -- --    07/12/25 0503 100.2 °F (37.9 °C) 100 16 119/101 Abnormal  97 % -- None (Room air) --    07/11/25 2253 -- -- -- -- -- 230 lb (104.3 kg) -- --    07/11/25 2246 99.2 °F (37.3 °C) 91 16 114/71 95 % -- None (Room air) --    07/11/25 2145 99.6 °F (37.6 °C) 90 15 -- 99 % -- -- --    07/11/25 2105 99.4 °F (37.4 °C) 92 18 117/73 99 % -- None (Room air) -- MP   07/11/25 1815 102.9 °F (39.4 °C) Abnormal  98 20 138/88 100 % -- None (Room air) --    07/11/25 1629 -- -- -- 140/85 -- -- -- -- JJ   07/11/25 1623 99.9 °F (37.7 °C) 108 22 -- 97 % 230 lb (104.3 kg) None (Room air) --        FOR REVIEW/APPROVAL OF INPT ADMISSION

## 2025-07-14 NOTE — DISCHARGE SUMMARY
[facility]OSPITAL[facility]  DISCHARGE SUMMARY     Amy Rosario Patient Status:  Inpatient    1990 MRN [id number]   Location [facility] 4NW-A Attending Marcial Dumont, DO   Hosp Day # 3 PCP None Pcp     Date of Admission: 2025  Date of Discharge:   2025    Discharge Disposition: Home or Self Care    Discharge Diagnosis:  #Sepsis 2/2 Left sided Pyelonephritis, Klebsiella UTI  -without evidence of end organ dysfunction   -Ceftriaxone  -Blood Cx NGTD and Urine Cx pending  -pain control     #nonobstructing left sided 3 mm stone  -strain urine    #Constipation/Ileus  -Diet as tolerated -limit opioids - reviewed judicious use of opioids, will prescribe limited  Norco on discharge  -Bowel regimen     History of Present Illness: History of Present Illness:      Amy Rosario is a 34 year old female with PMHx PCOS who presented to the hospital for left flank pain. Her symptoms began 2 days ago with a dull pressure pain which becomes sharp with movement rated 6-7/10. She had some relief with IV pain medication. She tried taking tylenol at home without relief. She had chills and fever at home but denied any dysuria, polyuria, urinary urgency.    Brief Synopsis: Patient admitted, treated for pyelonephritis, symptomatically improved with antibiotics, defervesced, blood cultures negative, initial urine culture without predominant organism, I requested lab rerun the specimen and it is growing Klebsiella but susceptibilities are pending at time of discharge, will discharge with cefdinir to complete course. TCC/PCP follow up    Lace+ Score: 19  59-90 High Risk  29-58 Medium Risk  0-28   Low Risk  Patient was referred to the [facility].    TCM Follow-Up Recommendation:  LACE < 29: Low Risk of readmission after discharge from the hospital; Still recommend for TCM follow-up.      Procedures during hospitalization:   N/a    Incidental or significant findings and recommendations (brief  descriptions):  N/a    Lab/Test results pending at Discharge:   Urine Cx susceptibilities     Consultants:  N/a    Discharge Medication List:     Discharge Medications        START taking these medications        Instructions Prescription details   cefdinir 300 MG Caps  Commonly known as: Omnicef      Take 1 capsule (300 mg total) by mouth 2 (two) times daily for 10 days.   Stop taking on: July 24, 2025  Quantity: 20 capsule  Refills: 0            CONTINUE taking these medications        Instructions Prescription details   acetaminophen 325 MG Tabs  Commonly known as: Tylenol      Take 1 tablet (325 mg total) by mouth every 6 (six) hours as needed for Pain.   Refills: 0     Biotin Plus/Calcium/Vit D3 Tabs      Take by mouth.   Refills: 0     cyanocobalamin 1000 MCG Tabs  Commonly known as: Vitamin B12      Take 1 tablet (1,000 mcg total) by mouth in the morning.   Refills: 0     HYDROcodone-acetaminophen 5-325 MG Tabs  Commonly known as: Norco      Take 1 tablet by mouth nightly as needed for Pain.   Quantity: 20 tablet  Refills: 0            STOP taking these medications      traMADol 50 MG Tabs  Commonly known as: Ultram                  Where to Get Your Medications        These medications were sent to [id number] - [place], [place] - [address] AT [facility] [address], [phone], [phone]  [facility] [address], [facility] [address]      Hours: 24-hours Phone: [phone]   cefdinir 300 MG Caps  HYDROcodone-acetaminophen 5-325 MG Tabs         [place]    Follow-up appointment:   Pcp, None  [facility] [id number]    Follow up      Appointments for Next 30 Days 7/14/2025 - 8/13/2025      None            Vital signs:  Temp:  [98.4 °F (36.9 °C)-100.9 °F (38.3 °C)] 98.7 °F (37.1 °C)  Pulse:  [69-91] 72  Resp:  [18] 18  BP: (114-122)/(70-76) 119/73  SpO2:  [100 %] 100 %    Physical Exam:    General: No acute distress   Lungs: clear to auscultation  Cardiovascular: S1,  S2  Abdomen: Soft      -----------------------------------------------------------------------------------------------  PATIENT DISCHARGE INSTRUCTIONS: See electronic chart    Marcial Dumont DO    Total time spent on discharge plannin minutes     The  Century Cures Act makes medical notes like these available to patients in the interest of transparency. Please be advised this is a medical document. Medical documents are intended to carry relevant information, facts as evident, and the clinical opinion of the practitioner. The medical note is intended as peer to peer communication and may appear blunt or direct. It is written in medical language and may contain abbreviations or verbiage that are unfamiliar.

## 2025-07-15 NOTE — PROGRESS NOTES
Hospital follow up.    TCC request.    Attempt #1:  Left message on voicemail for patient to call transitions specialist back to schedule follow up appointments. Provided Transitions specialist scheduling phone number [phone].

## 2025-07-15 NOTE — PROGRESS NOTES
[address] [address]  ?  07/15/25  ?  Re: Amy Rosario  ?  To Whom It May Concern:    Amy Rosario was admitted to [facility] from 7/11/2025 to 07/14/25.    Please excuse Amy Rosario from attending work for these days.      Patient will follow up with their primary care physician upon discharge.   Further restrictions and work excuse will be based on primary care physician's evaluation.  ?  Thank you,    Marcial Dumont, DO  [facility] Hospitalist

## 2025-07-16 NOTE — PROGRESS NOTES
Hospital follow up.    TCC request.    Attempt #2:  Left message on voicemail for patient to call transitions specialist back to schedule follow up appointments. Provided Transitions specialist scheduling phone number [phone].

## 2025-07-17 NOTE — PROGRESS NOTES
The following individual(s) verbally consented to be recorded using ambient AI listening technology and understand that they can each withdraw their consent to this listening technology at any point by asking the clinician to turn off or pause the recording:    Patient name: Amy Rosario

## 2025-07-17 NOTE — PROGRESS NOTES
CHIEF COMPLAINT:     Chief Complaint   Patient presents with    TCM (Transition Of Care Management)       HPI:   Amy Rosario is a 34 year old female who presents with hospital follow up and work note   History of Present Illness  Amy Rosario is a 34 year old female who presents for a hospital follow-up after a recent kidney infection.    She initially experienced symptoms last Thursday, beginning with what she described as a stiff side. This progressed to chills and shivering, and her  noted she was radiating heat. By Friday morning, her condition worsened, and she had to leave work after two hours due to a fever of 99.8°F. Her fever later increased to 100.7°F, prompting a visit to the ER where she was diagnosed with a kidney infection and admitted to the hospital for a few days.    Currently, she is on antibiotics with eight days remaining in her course. She feels 'pretty good' but still experiences some soreness, aches, and exhaustion. She notes improvement compared to her previous condition. No ear problems, pelvic pain, or significant back pain, though she describes some residual aches and a sensation similar to a sore muscle.    She has a history of kidney stones.       She was discharged from Inpatient hospital, [facility] to Home     Admit Date: 7/11/25  Discharge Date: 7/14/25  Hospital Discharge Diagnosis:   Discharge Diagnosis:  #Sepsis 2/2 Left sided Pyelonephritis, Klebsiella UTI  -without evidence of end organ dysfunction   -Ceftriaxone  -Blood Cx NGTD and Urine Cx pending  -pain control     #nonobstructing left sided 3 mm stone  -strain urine    #Constipation/Ileus  -Diet as tolerated   Interactive contact within 2 business days post discharge first initiated on Date: 7/15/2025    During the visit, the following was completed:  Obtained and reviewed discharge summary, continuity of care documents, and Hospitalist notes  Reviewed Labs (CBC, CMP)  specialists already aware  of assumption/resumption of care  Education given to patient on self-management, independent living, and activities of daily living.  Referrals as listed below in orders    Medication Reconciliation:  I am aware of an inpatient discharge within the last 30 days.  The discharge medication list has been reconciled with the patient's current medication list and reviewed by me.  See medication list for additions of new medication, and changes to current doses of medications and discontinued medications.    Transitional Care Management Certification:  I certify that the following are true:  Communication with the patient was made within 2 business days of discharge on date above   Medical Decision Making- Based on service period of discharge to 30 days:   Number of Possible Diagnoses and/or Management Options: moderate  Amount and/or Complexity of Data to Be Reviewed: moderate  Risk of Significant Complications, Morbidity, and/or Mortality: moderate    Overall Risk:   moderate    Patient seen within 7 days from date of discharge.     Christian Ackerman, APRN, 7/17/2025     Current Medications[1]   Past Medical History[2]   Social History:  Short Social Hx on File[3]      REVIEW OF SYSTEMS:   GENERAL: Denies fever, chills, or body aches  SKIN: no rashes, no skin wounds or ulcers.  CARDIOVASCULAR: denies chest pain or palpitations  LUNGS: denies shortness of breath, cough, or wheezing  GI: See HPI. No N/V/C/D.   : See HPI.  Musculo: No back pain     EXAM:   /80   Pulse 72   Resp 16   Ht 5' 7\" (1.702 m)   Wt 239 lb (108.4 kg)   LMP 07/12/2025 (Approximate)   SpO2 98%   BMI 37.43 kg/m²   GENERAL: well developed, well nourished,in no apparent distress  CARDIO: RRR, no murmurs  LUNGS: clear to ausculation bilaterally, no wheezing or rhonchi  ABD: BS present x 4.  No tenderness to palpation, No hepatosplenomegaly   : no  suprapubic tenderness, bladder distention, or CVA tenderness           ASSESSMENT AND PLAN:    Diagnoses and all orders for this visit:    Pyelonephritis  -     Urology Referral - In Network    Kidney stone  -     Urology Referral - In Network  Assessment & Plan  Kidney infection  Kidney infection improving with antibiotics. Fever resolved, no pelvic pain. Possible kidney stone etiology.  - Continue antibiotics for eight more days.  - Advise Effer OTC for UTI prevention.    Kidney stone  Kidney stone may have contributed to infection. No current pain.  - Refer to urology for follow-up.    Return to work  Plans to return to work if fever-free and well.  - Provide return to work note.  - Instruct to schedule urology appointment in [place].                [1]   Current Outpatient Medications   Medication Sig Dispense Refill    HYDROcodone-acetaminophen (NORCO) 5-325 MG Oral Tab Take 1 tablet by mouth nightly as needed for Pain. 20 tablet 0    cefdinir 300 MG Oral Cap Take 1 capsule (300 mg total) by mouth 2 (two) times daily for 10 days. 20 capsule 0    acetaminophen 325 MG Oral Tab Take 1 tablet (325 mg total) by mouth every 6 (six) hours as needed for Pain.      Multiple Vitamins-Minerals (BIOTIN PLUS/CALCIUM/VIT D3) Oral Tab Take by mouth.      cyanocobalamin 1000 MCG Oral Tab Take 1 tablet (1,000 mcg total) by mouth in the morning.     [2]   Past Medical History:   PCOS (polycystic ovarian syndrome)   [3]   Social History  Socioeconomic History    Marital status:    Tobacco Use    Smoking status: Never     Passive exposure: Never    Smokeless tobacco: Never   Vaping Use    Vaping status: Never Used   Substance and Sexual Activity    Alcohol use: Yes     Comment: RARELY    Drug use: Never     Social Drivers of Health     Food Insecurity: Patient Declined (7/11/2025)    NCSS - Food Insecurity     Worried About Running Out of Food in the Last Year: Patient declined     Ran Out of Food in the Last Year: Patient declined   Transportation Needs: Patient Declined (7/11/2025)    NCSS - Transportation      Lack of Transportation: Patient declined   Housing Stability: Patient Declined (7/11/2025)    NCSS - Housing/Utilities     Has Housing: Patient declined     Worried About Losing Housing: Patient declined     Unable to Get Utilities: Patient declined

## 2025-07-24 NOTE — PAYOR COMM NOTE
--------------  DISCHARGE REVIEW    Payor: BLUE CROSS [facility] PPO  Subscriber #:  [id number]  Authorization Number: [id number]    Admit date: 25  Admit time:  10:43 PM  Discharge Date: 2025  5:03 PM     Admitting Physician: Eduardo Hutson DO  Attending Physician:  No att. providers found  Primary Care Physician: Pcp, None          Discharge Summary Notes        Discharge Summary signed by Marcial Dumont DO at 2025  3:30 PM       Author: Marcial Dumont DO Specialty: HOSPITALIST, Internal Medicine Author Type: Physician    Filed: 2025  3:30 PM Date of Service: 2025  3:27 PM Status: Signed    : Marcial Dumont DO (Physician)           [facility]     Amy Rosario Patient Status:  Inpatient    1990 MRN [id number]   Location [facility] 4N-A Attending Marcial Dumont DO   Hosp Day # 3 PCP None Pcp     Date of Admission: 2025  Date of Discharge:   2025    Discharge Disposition: Home or Self Care    Discharge Diagnosis:  #Sepsis 2/2 Left sided Pyelonephritis, Klebsiella UTI  -without evidence of end organ dysfunction   -Ceftriaxone  -Blood Cx NGTD and Urine Cx pending  -pain control     #nonobstructing left sided 3 mm stone  -strain urine    #Constipation/Ileus  -Diet as tolerated -limit opioids - reviewed judicious use of opioids, will prescribe limited  Norco on discharge  -Bowel regimen     History of Present Illness: History of Present Illness:      Amy Rosario is a 34 year old female with PMHx PCOS who presented to the hospital for left flank pain. Her symptoms began 2 days ago with a dull pressure pain which becomes sharp with movement rated 6-7/10. She had some relief with IV pain medication. She tried taking tylenol at home without relief. She had chills and fever at home but denied any dysuria, polyuria, urinary urgency.    Brief Synopsis: Patient admitted, treated for pyelonephritis, symptomatically improved  with antibiotics, defervesced, blood cultures negative, initial urine culture without predominant organism, I requested lab rerun the specimen and it is growing Klebsiella but susceptibilities are pending at time of discharge, will discharge with cefdinir to complete course. TCC/PCP follow up    Lace+ Score: 19  59-90 High Risk  29-58 Medium Risk  0-28   Low Risk  Patient was referred to the [facility].    TCM Follow-Up Recommendation:  LACE < 29: Low Risk of readmission after discharge from the hospital; Still recommend for TCM follow-up.      Procedures during hospitalization:   N/a    Incidental or significant findings and recommendations (brief descriptions):  N/a    Lab/Test results pending at Discharge:   Urine Cx susceptibilities     Consultants:  N/a    Discharge Medication List:     Discharge Medications        START taking these medications        Instructions Prescription details   cefdinir 300 MG Caps  Commonly known as: Omnicef      Take 1 capsule (300 mg total) by mouth 2 (two) times daily for 10 days.   Stop taking on: July 24, 2025  Quantity: 20 capsule  Refills: 0            CONTINUE taking these medications        Instructions Prescription details   acetaminophen 325 MG Tabs  Commonly known as: Tylenol      Take 1 tablet (325 mg total) by mouth every 6 (six) hours as needed for Pain.   Refills: 0     Biotin Plus/Calcium/Vit D3 Tabs      Take by mouth.   Refills: 0     cyanocobalamin 1000 MCG Tabs  Commonly known as: Vitamin B12      Take 1 tablet (1,000 mcg total) by mouth in the morning.   Refills: 0     HYDROcodone-acetaminophen 5-325 MG Tabs  Commonly known as: Norco      Take 1 tablet by mouth nightly as needed for Pain.   Quantity: 20 tablet  Refills: 0            STOP taking these medications      traMADol 50 MG Tabs  Commonly known as: Ultram                  Where to Get Your Medications        These medications were sent to [id number] - [place], [place]  - [address] AT [facility] [place] [address], [phone], [phone]  [address], [facility] [address]      Hours: 24-hours Phone: [phone]   cefdinir 300 MG Caps  HYDROcodone-acetaminophen 5-325 MG Tabs         [place]    Follow-up appointment:   Pcp, None  [place] [address]    Follow up      Appointments for Next 30 Days 2025 - 2025      None            Vital signs:  Temp:  [98.4 °F (36.9 °C)-100.9 °F (38.3 °C)] 98.7 °F (37.1 °C)  Pulse:  [69-91] 72  Resp:  [18] 18  BP: (114-122)/(70-76) 119/73  SpO2:  [100 %] 100 %    Physical Exam:    General: No acute distress   Lungs: clear to auscultation  Cardiovascular: S1, S2  Abdomen: Soft      -----------------------------------------------------------------------------------------------  PATIENT DISCHARGE INSTRUCTIONS: See electronic chart    Marcial Dumont DO    Total time spent on discharge plannin minutes     The  Century Cures Act makes medical notes like these available to patients in the interest of transparency. Please be advised this is a medical document. Medical documents are intended to carry relevant information, facts as evident, and the clinical opinion of the practitioner. The medical note is intended as peer to peer communication and may appear blunt or direct. It is written in medical language and may contain abbreviations or verbiage that are unfamiliar.       Electronically signed by Marcial Dumont DO on 2025  3:30 PM         REVIEWER COMMENTS

## 2025-07-25 NOTE — PAYOR COMM NOTE
--------------  7/13:  CONTINUED STAY REVIEW    Payor: BLUE CROSS [employer] PPO  Subscriber #:  [id number]  Authorization Number: [id number]    Admit date: 7/11/25  Admit time: 10:43 PM        HOSPITALIST:    Chief Complaint: Left flank pain     Subjective:  Pain controlled with Norco, no BM but passing gas  Fever this afternoon      Objective:  Review of Systems:   A comprehensive review of systems was completed; pertinent positive and negatives stated in subjective.     Vital signs:  Temp:  [98.3 °F (36.8 °C)-102.7 °F (39.3 °C)] 98.4 °F (36.9 °C)  Pulse:  [75-96] 92  Resp:  [14-16] 16  BP: (114-123)/(69-79) 123/72  SpO2:  [100 %] 100 %     Physical Exam:    General: No acute distress  Respiratory: No wheezes, no rhonchi  Cardiovascular: S1, S2, regular rate and rhythm  Abdomen: Soft, Non-tender, non-distended, positive bowel sounds  Neuro: No new focal deficits.   Extremities: No edema        Diagnostic Data:    Labs:        Recent Labs   Lab 07/11/25  1630 07/12/25  0508 07/13/25  0650   WBC 20.9* 17.8* 12.3*   HGB 13.8 12.3 11.9*   MCV 82.8 83.7 83.1   .0 229.0 217.0               Recent Labs   Lab 07/11/25  1630 07/12/25  0508 07/13/25  0650   * 102* 88   BUN 10 7* 7*   CREATSERUM 0.87 0.85 0.75   CA 9.3 8.7 8.3*   ALB 5.1* 4.5 4.0   * 137 136   K 3.7 3.8 4.2    104 104   CO2 22.0 23.0 24.0   ALKPHO 65 58 56   AST 16 16 19   ALT 17 15 17   BILT 1.5* 1.3* 0.5   TP 8.1 7.4 6.8         Estimated Glomerular Filtration Rate: 108 mL/min/1.73m2 (result from lab).     No results for input(s): \"TROP\", \"TROPHS\", \"CK\" in the last 168 hours.     No results for input(s): \"PTP\", \"INR\" in the last 168 hours.                 Microbiology            Hospital Encounter on 07/11/25   1. Blood Culture     Status: None (Preliminary result)     Collection Time: 07/11/25  6:05 PM     Specimen: Blood,peripheral   Result Value Ref Range     Blood Culture Result No Growth 1 Day N/A   2. Urine Culture, Routine      Status: None     Collection Time: 07/11/25  5:14 PM     Specimen: Urine, clean catch   Result Value Ref Range     Urine Culture   N/A       >100,000 cfu/ml Three or more species of Gram negative bacilli; none predominant. No further workup performed.                  Assessment & Plan:  #Sepsis 2/2 Left sided Pyelonephritis   -without evidence of end organ dysfunction   -Ceftriaxone  -Blood Cx NGTD and Urine Cx no predominant, will call lab   -IVF  -pain control     #nonobstructing left sided 3 mm stone  -strain urine    #Constipation/Ileus  -Diet as tolerated  -limit opioids  -Bowel regimen      Rami Swapnili, DO      Vitals (last day) before discharge       Date/Time Temp Pulse Resp BP SpO2 Weight O2 Device O2 Flow Rate (L/min) [facility]    07/14/25 1130 98.7 °F (37.1 °C) 72 18 119/73 100 % -- None (Room air) -- CS    07/14/25 0433 98.5 °F (36.9 °C) 69 18 114/70 100 % -- None (Room air) --     07/14/25 0019 98.4 °F (36.9 °C) -- -- -- -- -- -- --     07/13/25 2053 100.9 °F (38.3 °C) 91 18 122/76 100 % -- None (Room air) --     07/13/25 1256 98.4 °F (36.9 °C) -- -- -- -- -- -- --     07/13/25 1145 101.7 °F (38.7 °C) 92 16 123/72 100 % -- None (Room air) -- AL    07/13/25 0500 98.3 °F (36.8 °C) 75 14 116/69 100 % -- None (Room air) -- LP

## 2025-07-25 NOTE — PAYOR COMM NOTE
REQUESTING APPROVAL FOR ALL DAYS UP TO ( but not including)      PLEASE FAX  DAYS AUTHORIZED [phone]    THANK YOU!          --------------  DISCHARGE REVIEW    Payor: BLUE CROSS FEP PPO  Subscriber #:  [id number]  Authorization Number: [id number]    Admit date: 25  Admit time:  10:43 PM  Discharge Date: 2025  5:03 PM     Admitting Physician: Eduardo Hutson DO  Attending Physician:  No att. providers found  Primary Care Physician: Pcp, None          Discharge Summary Notes        Discharge Summary signed by Marcial Dumont DO at 2025  3:30 PM       Author: Marcial Dumont DO Specialty: HOSPITALIST, Internal Medicine Author Type: Physician    Filed: 2025  3:30 PM Date of Service: 2025  3:27 PM Status: Signed    : Marcial Dumont DO (Physician)           [facility]     Amy Rosario Patient Status:  Inpatient    1990 MRN [id number]   [facility] 4NW-A Attending Marcial Dumont DO   Hosp Day # 3 PCP None Pcp     Date of Admission: 2025  Date of Discharge:   2025    Discharge Disposition: Home or Self Care    Discharge Diagnosis:  #Sepsis 2/2 Left sided Pyelonephritis, Klebsiella UTI  -without evidence of end organ dysfunction   -Ceftriaxone  -Blood Cx NGTD and Urine Cx pending  -pain control     #nonobstructing left sided 3 mm stone  -strain urine    #Constipation/Ileus  -Diet as tolerated -limit opioids - reviewed judicious use of opioids, will prescribe limited  Norco on discharge  -Bowel regimen     History of Present Illness: History of Present Illness:      Amy Rosario is a 34 year old female with PMHx PCOS who presented to the hospital for left flank pain. Her symptoms began 2 days ago with a dull pressure pain which becomes sharp with movement rated 6-7/10. She had some relief with IV pain medication. She tried taking tylenol at home without relief. She had chills and fever at home but  denied any dysuria, polyuria, urinary urgency.    Brief Synopsis: Patient admitted, treated for pyelonephritis, symptomatically improved with antibiotics, defervesced, blood cultures negative, initial urine culture without predominant organism, I requested lab rerun the specimen and it is growing Klebsiella but susceptibilities are pending at time of discharge, will discharge with cefdinir to complete course. TCC/PCP follow up    Lace+ Score: 19  59-90 High Risk  29-58 Medium Risk  0-28   Low Risk  Patient was referred to the [facility].    TCM Follow-Up Recommendation:  LACE < 29: Low Risk of readmission after discharge from the hospital; Still recommend for TCM follow-up.      Procedures during hospitalization:   N/a    Incidental or significant findings and recommendations (brief descriptions):  N/a    Lab/Test results pending at Discharge:   Urine Cx susceptibilities     Consultants:  N/a    Discharge Medication List:     Discharge Medications        START taking these medications        Instructions Prescription details   cefdinir 300 MG Caps  Commonly known as: Omnicef      Take 1 capsule (300 mg total) by mouth 2 (two) times daily for 10 days.   Stop taking on: July 24, 2025  Quantity: 20 capsule  Refills: 0            CONTINUE taking these medications        Instructions Prescription details   acetaminophen 325 MG Tabs  Commonly known as: Tylenol      Take 1 tablet (325 mg total) by mouth every 6 (six) hours as needed for Pain.   Refills: 0     Biotin Plus/Calcium/Vit D3 Tabs      Take by mouth.   Refills: 0     cyanocobalamin 1000 MCG Tabs  Commonly known as: Vitamin B12      Take 1 tablet (1,000 mcg total) by mouth in the morning.   Refills: 0     HYDROcodone-acetaminophen 5-325 MG Tabs  Commonly known as: Norco      Take 1 tablet by mouth nightly as needed for Pain.   Quantity: 20 tablet  Refills: 0            STOP taking these medications      traMADol 50 MG Tabs  Commonly known as:  Ultram                  Where to Get Your Medications        These medications were sent to [id number] - [place], [place] - [address] AT [facility] [address], [phone], [phone]  [address], [facility] [address]      Hours: 24-hours Phone: [phone]   cefdinir 300 MG Caps  HYDROcodone-acetaminophen 5-325 MG Tabs         [place]    Follow-up appointment:   Pcp, None  [place] [address]    Follow up      Appointments for Next 30 Days 2025 - 2025      None            Vital signs:  Temp:  [98.4 °F (36.9 °C)-100.9 °F (38.3 °C)] 98.7 °F (37.1 °C)  Pulse:  [69-91] 72  Resp:  [18] 18  BP: (114-122)/(70-76) 119/73  SpO2:  [100 %] 100 %    Physical Exam:    General: No acute distress   Lungs: clear to auscultation  Cardiovascular: S1, S2  Abdomen: Soft      -----------------------------------------------------------------------------------------------  PATIENT DISCHARGE INSTRUCTIONS: See electronic chart    Marcial Dumont DO    Total time spent on discharge plannin minutes     The  Century Cures Act makes medical notes like these available to patients in the interest of transparency. Please be advised this is a medical document. Medical documents are intended to carry relevant information, facts as evident, and the clinical opinion of the practitioner. The medical note is intended as peer to peer communication and may appear blunt or direct. It is written in medical language and may contain abbreviations or verbiage that are unfamiliar.       Electronically signed by Marcial Dumont DO on 2025  3:30 PM         REVIEWER COMMENTS

## (undated) NOTE — LETTER
[facility], [address] [place], [place] [address] [address] [place] [address]  Ph:[phone]  Fax:[phone]        Patient Information:  Patient Name:   Address: Amy Rosario, ([id number])  [address] [place] [address]  [phone] (home)  Sex: female          : 1990   ________________________________________________________________  Referral Information:  Order Date: 2024       Epic Order #: [id number]   Referral Type: Physical Therapy Referral - [facility] Dx: Partial tear of ligament of lateral aspect of ankle (S93.499A)  Tendonitis (M77.9)   Signed Referral [place]:     Physical Therapy Area of Concentration: Ortho  Physical Therapy Ortho: General  Comments: LE eval and treat  Ankle rehab  Number of Visits: 8     Unless otherwise indicated by your provider or insurance, feel free to schedule your appointment with the first available provider in the specialist group you were referred to that meets your scheduling needs if the referred to provider is not available.           ______________________________________________________________  Scheduling Information:  **THIS IS NOT A REFERRAL**  Your physician has recommended you to [facility].  If your insurance requires you to obtain a managed care referral,   please allow 3 working days from the date of this order for the   referral to be processed.  Please wait 3 working days to schedule   your appointment unless notified.  To schedule at any of the follow locations, please  call: [phone]            [facility]                Ctr in [place]                   [facility]tr                       [address] [facility]                       [address]                             [address]            [place], [place] [address] [place] [address] [address] [place] [address]                 Ctr in [place]                    Ctr in [facility]           [address]                 [address]                                [place] [address]               [place] [address]                                [facility]                Ctr in [place]                    Ctr in [place]                  [address]               [address] [place] [place] [address]              [place]  [address]                                    [facility]        [facility]                             [address] [place]  [address]        [address] [place] [address]             [place], [place], [address]                       _______________________________________________________         Referred to Department Information      Department Address City Phone      PHYSICAL THERAPY [address] [place] [phone]          Coverage Information:      Active Insurance as of 4/26/2024              Primary Coverage               Payor Plan Insurance Group Employer/Plan Group      [employer] [place] P[place] BLUE CROSS FEP                 Payor Address Payor Phone Number Payor Fax Number Effective Dates      [address]     1/20/2019 - None Entered      [facility] [id number]                    Subscriber Name Subscriber Birth Date Member ID           GABRIELARGELIA LYLE GALINA 11/21/1990 [id number]                Guarantor Name (ID) Guarantor Birth Date Guarantor Address Guarantor Type      ARGELIA RIOS ([id number]) 11/21/1990 [address] Personal/Family          [facility] [address]                    Electronically Signed By: Adeola Quintero DPM [NPI: [id number]]  Order Date: Apr 26, 2024 at 12:30 PM

## (undated) NOTE — LETTER
Date: 6/4/2025    Patient Name: Amy Rosario          To Whom it may concern:    This letter has been written at the patient's request. The above patient was seen at [facility] for treatment of a medical condition.    This patient should be excused from attending work/school from 06/02/25 through 06/05/25.    The patient may return to work/school on 06/06/25 with the following limitations None.        Sincerely,    Riri Mills PA-C

## (undated) NOTE — LETTER
Date & Time: 4/20/2024, 11:30 AM  Patient: Amy Rosario  Encounter Provider(s):    Maulik Rouse APRN       To Whom It May Concern:    Amy Rosario was seen and treated in our department on 4/20/2024. She should not return to work until cleared by Ortho .    If you have any questions or concerns, please do not hesitate to call.        _____________________________  Physician/APC Signature

## (undated) NOTE — LETTER
Date: 7/17/2025    Patient Name: Amy Rosario          To Whom it may concern:    The above patient was seen at [facility] for treatment of a medical condition.    This patient should be excused from attending work/school.    The patient may return to work/school on 7/21/2025 without restrictions.         Sincerely,    CHRISTIAN Fajardo